# Patient Record
Sex: MALE | Race: WHITE | Employment: FULL TIME | URBAN - METROPOLITAN AREA
[De-identification: names, ages, dates, MRNs, and addresses within clinical notes are randomized per-mention and may not be internally consistent; named-entity substitution may affect disease eponyms.]

---

## 2017-01-13 ENCOUNTER — OFFICE VISIT (OUTPATIENT)
Dept: FAMILY MEDICINE CLINIC | Age: 47
End: 2017-01-13

## 2017-01-13 VITALS
WEIGHT: 199 LBS | DIASTOLIC BLOOD PRESSURE: 88 MMHG | BODY MASS INDEX: 30.16 KG/M2 | HEIGHT: 68 IN | OXYGEN SATURATION: 98 % | SYSTOLIC BLOOD PRESSURE: 128 MMHG | HEART RATE: 90 BPM

## 2017-01-13 DIAGNOSIS — Z00.00 WELL ADULT EXAM: Primary | ICD-10-CM

## 2017-01-13 PROCEDURE — 99396 PREV VISIT EST AGE 40-64: CPT | Performed by: FAMILY MEDICINE

## 2017-02-03 ENCOUNTER — TELEPHONE (OUTPATIENT)
Dept: ORTHOPEDIC SURGERY | Age: 47
End: 2017-02-03

## 2017-03-20 ENCOUNTER — OFFICE VISIT (OUTPATIENT)
Dept: FAMILY MEDICINE CLINIC | Age: 47
End: 2017-03-20

## 2017-03-20 VITALS
SYSTOLIC BLOOD PRESSURE: 120 MMHG | DIASTOLIC BLOOD PRESSURE: 84 MMHG | HEART RATE: 74 BPM | OXYGEN SATURATION: 96 % | BODY MASS INDEX: 31.7 KG/M2 | WEIGHT: 205.4 LBS

## 2017-03-20 DIAGNOSIS — M94.0 COSTOCHONDRITIS: Primary | ICD-10-CM

## 2017-03-20 PROCEDURE — 99213 OFFICE O/P EST LOW 20 MIN: CPT | Performed by: FAMILY MEDICINE

## 2017-03-20 RX ORDER — MELOXICAM 15 MG/1
15 TABLET ORAL DAILY
Qty: 30 TABLET | Refills: 3
Start: 2017-03-20 | End: 2018-12-06

## 2018-11-29 ENCOUNTER — TELEPHONE (OUTPATIENT)
Dept: FAMILY MEDICINE CLINIC | Age: 48
End: 2018-11-29

## 2018-11-29 DIAGNOSIS — Z00.00 PHYSICAL EXAM, ANNUAL: Primary | ICD-10-CM

## 2018-12-06 ENCOUNTER — OFFICE VISIT (OUTPATIENT)
Dept: FAMILY MEDICINE CLINIC | Age: 48
End: 2018-12-06
Payer: COMMERCIAL

## 2018-12-06 VITALS
DIASTOLIC BLOOD PRESSURE: 70 MMHG | WEIGHT: 201.2 LBS | SYSTOLIC BLOOD PRESSURE: 102 MMHG | BODY MASS INDEX: 30.49 KG/M2 | HEART RATE: 73 BPM | HEIGHT: 68 IN | OXYGEN SATURATION: 98 %

## 2018-12-06 DIAGNOSIS — H61.23 BILATERAL IMPACTED CERUMEN: ICD-10-CM

## 2018-12-06 DIAGNOSIS — Z00.00 PREVENTATIVE HEALTH CARE: Primary | ICD-10-CM

## 2018-12-06 PROCEDURE — 99396 PREV VISIT EST AGE 40-64: CPT | Performed by: PHYSICIAN ASSISTANT

## 2018-12-06 ASSESSMENT — ENCOUNTER SYMPTOMS
TROUBLE SWALLOWING: 0
DIARRHEA: 0
SHORTNESS OF BREATH: 0
VOICE CHANGE: 0
CONSTIPATION: 0
CHEST TIGHTNESS: 0
SORE THROAT: 0
BACK PAIN: 0
EYE PAIN: 0
COUGH: 0
ABDOMINAL PAIN: 0

## 2018-12-06 ASSESSMENT — PATIENT HEALTH QUESTIONNAIRE - PHQ9
SUM OF ALL RESPONSES TO PHQ QUESTIONS 1-9: 0
SUM OF ALL RESPONSES TO PHQ QUESTIONS 1-9: 0
1. LITTLE INTEREST OR PLEASURE IN DOING THINGS: 0
2. FEELING DOWN, DEPRESSED OR HOPELESS: 0
SUM OF ALL RESPONSES TO PHQ9 QUESTIONS 1 & 2: 0

## 2018-12-06 NOTE — PROGRESS NOTES
Subjective:      Patient ID: Cirilo Black is a 50 y.o. male. HPI Patient is here today for his annual check up. He has no complaints today. He had labs done last week. He does not take any medications on a daily basis. He has really worked on his diet the last 2 months, losing 15lbs. He is exercising a little but mostly dieting. He is sleeping \"ok\" but has trouble sleeping a few nights a week. He says he doesn't snore anymore since he lost weight. He says he has trouble falling asleep mainly when he falls asleep on the cough and wakes back up and goes to bed, can't fall back asleep. He has noticed very slightly a decreased urinary stream but \"not that bad\". He has no bowel issues. He has good energy level. He is current with tdap and flu vaccines. He is current with eye and dental exams as well. Review of Systems   Constitutional: Negative for appetite change, fatigue and unexpected weight change. HENT: Negative for congestion, dental problem, ear pain, hearing loss, sore throat, trouble swallowing and voice change. Eyes: Negative for pain and visual disturbance. Respiratory: Negative for cough, chest tightness and shortness of breath. Cardiovascular: Negative for chest pain and palpitations. Gastrointestinal: Negative for abdominal pain, constipation and diarrhea. Genitourinary: Negative for difficulty urinating. Musculoskeletal: Negative for arthralgias, back pain, myalgias and neck pain. Skin: Negative for rash. Neurological: Negative for dizziness, speech difficulty, weakness, numbness and headaches. Hematological: Negative for adenopathy. Psychiatric/Behavioral: Negative for confusion and sleep disturbance. The patient is not nervous/anxious. Objective:   Physical Exam   Constitutional: He is oriented to person, place, and time. Vital signs are normal. He appears well-developed and well-nourished. He is cooperative. HENT:   Head: Normocephalic.    Nose: Nose normal.   Mouth/Throat: Uvula is midline, oropharynx is clear and moist and mucous membranes are normal.   Bilateral cerumen impaction   Eyes: Pupils are equal, round, and reactive to light. Conjunctivae are normal.   Neck: Normal range of motion. Neck supple. No thyromegaly present. Cardiovascular: Normal rate, regular rhythm, S1 normal, S2 normal, normal heart sounds and normal pulses. PMI is not displaced. Pulmonary/Chest: Effort normal and breath sounds normal. He has no decreased breath sounds. He has no wheezes. He has no rhonchi. He has no rales. Abdominal: Soft. Normal appearance and bowel sounds are normal. There is no hepatosplenomegaly. There is no tenderness. There is no rebound, no guarding and no CVA tenderness. No hernia. Musculoskeletal: Normal range of motion. Lymphadenopathy:     He has no cervical adenopathy. Neurological: He is alert and oriented to person, place, and time. He has normal strength. No sensory deficit. Gait normal.   Skin: Skin is warm, dry and intact. No rash noted. No cyanosis. Psychiatric: He has a normal mood and affect. His speech is normal and behavior is normal.       Assessment:      Jb Bourne was seen today for annual exam and mass. Diagnoses and all orders for this visit:    Preventative health care    Bilateral impacted cerumen             Plan:      Reviewed labs, WNL other than slightly elevated LDL, all other HM current, return in a year.         [place], [place]

## 2019-12-03 ENCOUNTER — TELEPHONE (OUTPATIENT)
Dept: FAMILY MEDICINE CLINIC | Age: 49
End: 2019-12-03

## 2019-12-03 DIAGNOSIS — Z00.00 PHYSICAL EXAM, ANNUAL: Primary | ICD-10-CM

## 2020-01-03 ENCOUNTER — OFFICE VISIT (OUTPATIENT)
Dept: FAMILY MEDICINE CLINIC | Age: 50
End: 2020-01-03
Payer: COMMERCIAL

## 2020-01-03 VITALS
HEART RATE: 91 BPM | OXYGEN SATURATION: 95 % | BODY MASS INDEX: 32.8 KG/M2 | DIASTOLIC BLOOD PRESSURE: 84 MMHG | HEIGHT: 68 IN | SYSTOLIC BLOOD PRESSURE: 114 MMHG | WEIGHT: 216.4 LBS

## 2020-01-03 PROCEDURE — 99396 PREV VISIT EST AGE 40-64: CPT | Performed by: FAMILY MEDICINE

## 2020-01-03 RX ORDER — CLINDAMYCIN PHOSPHATE 10 MG/G
GEL TOPICAL
Qty: 30 G | Refills: 1 | Status: SHIPPED | OUTPATIENT
Start: 2020-01-03 | End: 2020-01-10

## 2020-01-03 RX ORDER — TERBINAFINE HYDROCHLORIDE 250 MG/1
250 TABLET ORAL DAILY
Qty: 90 TABLET | Refills: 0 | Status: SHIPPED | OUTPATIENT
Start: 2020-01-03 | End: 2020-04-02

## 2020-01-03 ASSESSMENT — PATIENT HEALTH QUESTIONNAIRE - PHQ9
SUM OF ALL RESPONSES TO PHQ QUESTIONS 1-9: 0
2. FEELING DOWN, DEPRESSED OR HOPELESS: 0
1. LITTLE INTEREST OR PLEASURE IN DOING THINGS: 0
SUM OF ALL RESPONSES TO PHQ9 QUESTIONS 1 & 2: 0
SUM OF ALL RESPONSES TO PHQ QUESTIONS 1-9: 0

## 2020-01-03 NOTE — PROGRESS NOTES
History and Physical      Stacy Castillo  YOB: 1970    Date of Service:  1/3/2020    Chief Complaint:   Stacy Castillo is a 52 y.o. male who  presents for physical examination. HPI: Patient presents for physical examination. He is concerned he keeps having recurrent skin infection over shoulder areas bilaterally. He also has a toenail that was injured and is discolored at this point of time. Otherwise he feels he is doing well.     Wt Readings from Last 3 Encounters:   01/03/20 216 lb 6.4 oz (98.2 kg)   12/06/18 201 lb 3.2 oz (91.3 kg)   03/20/17 205 lb 6.4 oz (93.2 kg)     BP Readings from Last 3 Encounters:   01/03/20 114/84   12/06/18 102/70   03/20/17 120/84       Patient Active Problem List   Diagnosis   (none) - all problems resolved or deleted       Preventive Care:  Health Maintenance   Topic Date Due    HIV screen  07/09/1985    DTaP/Tdap/Td vaccine (3 - Td) 11/22/2023    Lipid screen  12/31/2024    Flu vaccine  Completed    Pneumococcal 0-64 years Vaccine  Aged Out      Self-testicular exams: Yes  Sexual activity: single partner, contraception - none   Last eye exam: 2018, normal  Exercise: walks 2 time(s) per week    Lipid panel:    Lab Results   Component Value Date    CHOL 213 (H) 12/31/2019    CHOL 175 (H) 12/31/2019    TRIG 221 (H) 12/31/2019    HDL 38 (L) 12/31/2019    LDLCALC 131 (H) 12/31/2019       Living will : No    Immunization History   Administered Date(s) Administered    DT (pediatric) 03/27/2000    Influenza Vaccine, unspecified formulation 11/01/2015, 10/10/2016    Influenza Virus Vaccine 10/22/2013, 10/22/2014, 09/01/2019    Tdap (Boostrix, Adacel) 11/22/2013       Allergies   Allergen Reactions    Erythromycin      Stomach cramps     No outpatient medications have been marked as taking for the 1/3/20 encounter (Office Visit) with Karina Deleon MD.       Past Medical History:   Diagnosis Date    Panic disorder without agoraphobia     Pure Systems:  A comprehensive review of systems was negative except for what was noted in the HPI. Physical Exam:   Vitals:    01/03/20 0803   BP: 114/84   Site: Right Upper Arm   Position: Sitting   Cuff Size: Large Adult   Pulse: 91   SpO2: 95%   Weight: 216 lb 6.4 oz (98.2 kg)   Height: 5' 8\" (1.727 m)     Body mass index is 32.9 kg/m². Constitutional: He is oriented to person, place, and time. He appears well-developed and well-nourished. No distress. HEENT:   Head: Normocephalic and atraumatic. Right Ear: Tympanic membrane, external ear and ear canal normal.   Left Ear: Tympanic membrane, external ear and ear canal normal.   Nose: Nose normal.   Mouth/Throat: Oropharynx is clear and moist and mucous membranes are normal. No oropharyngeal exudate or posterior oropharyngeal erythema. He has no cervical adenopathy. Eyes: Conjunctivae and extraocular motions are normal. Pupils are equal, round, and reactive to light. Neck: Full passive range of motion without pain. Neck supple. No JVD present. Carotid bruit is not present. No mass and no thyromegaly present. Cardiovascular: Normal rate, regular rhythm, normal heart sounds and intact distal pulses. Exam reveals no gallop and no friction rub. No murmur heard. Pulmonary/Chest: Effort normal and breath sounds normal. No respiratory distress. He has no wheezes, rhonchi or rales. Abdominal: Soft, non-tender. Bowel sounds and aorta are normal. There is no organomegaly, mass or bruit. Genitourinary:  genitals normal without hernia or inguinal adenopathy. Musculoskeletal: Normal range of motion, no synovitis. He exhibits no edema. Neurological: He is alert and oriented to person, place, and time. He has normal reflexes. No cranial nerve deficit. Coordination normal.   Skin: Skin is warm, dry and intact. No suspicious lesions are noted. Right great toenail with typical tinea involvement.   Bilateral shoulders with folliculitis right greater than left  Psychiatric: He has a normal mood and affect. His speech is normal and behavior is normal. Judgment, cognition and memory are normal.     Assessment/Plan:    Nhi Ambrose was seen today for annual exam.    Diagnoses and all orders for this visit:    Well adult exam    Tinea unguium    Folliculitis    Other orders  -     terbinafine (LAMISIL) 250 MG tablet; Take 1 tablet by mouth daily  -     clindamycin (CLINDAGEL) 1 % gel; Apply topically 2 times daily. Pt appears stable & reviewed labs with patient. Patient received counseling on the following healthy behaviors: nutrition and exercise     Patient given educational materials     Health maintenance updated    Discussed use, benefit, and side effects of prescribed medications. Barriers to medication compliance addressed. All patient questions answered. Pt voiced understanding. Patient needs RTC in one year. Please note that this chart was generated using Dragon dictation software. Although every effort was made to ensure the accuracy of this automated transcription, some errors in transcription may have occurred. Patient was evaluated and examined. I performed the physical examination and key/critical portions of the history were approved and edited.  I also confirm that the note above accurately reflects all work, treatment, procedures, and medical decision making performed by me, Mitzy Diana M.D.

## 2020-01-03 NOTE — PATIENT INSTRUCTIONS
exposed skin. · See a dentist one or two times a year for checkups and to have your teeth cleaned. · Wear a seat belt in the car. Follow your doctor's advice about when to have certain tests. These tests can spot problems early. For everyone  · Cholesterol. Have the fat (cholesterol) in your blood tested after age 21. Your doctor will tell you how often to have this done based on your age, family history, or other things that can increase your risk for heart disease. · Blood pressure. Have your blood pressure checked during a routine doctor visit. Your doctor will tell you how often to check your blood pressure based on your age, your blood pressure results, and other factors. · Vision. Talk with your doctor about how often to have a glaucoma test.  · Diabetes. Ask your doctor whether you should have tests for diabetes. · Colon cancer. Your risk for colorectal cancer gets higher as you get older. Some experts say that adults should start regular screening at age 48 and stop at age 76. Others say to start before age 48 or continue after age 76. Talk with your doctor about your risk and when to start and stop screening. For women  · Breast exam and mammogram. Talk to your doctor about when you should have a clinical breast exam and a mammogram. Medical experts differ on whether and how often women under 50 should have these tests. Your doctor can help you decide what is right for you. · Cervical cancer screening test and pelvic exam. Begin with a Pap test at age 24. The test often is part of a pelvic exam. Starting at age 27, you may choose to have a Pap test, an HPV test, or both tests at the same time (called co-testing). Talk with your doctor about how often to have testing. · Tests for sexually transmitted infections (STIs). Ask whether you should have tests for STIs. You may be at risk if you have sex with more than one person, especially if your partners do not wear condoms.   For men  · Tests for sexually transmitted infections (STIs). Ask whether you should have tests for STIs. You may be at risk if you have sex with more than one person, especially if you do not wear a condom. · Testicular cancer exam. Ask your doctor whether you should check your testicles regularly. · Prostate exam. Talk to your doctor about whether you should have a blood test (called a PSA test) for prostate cancer. Experts differ on whether and when men should have this test. Some experts suggest it if you are older than 39 and are -American or have a father or brother who got prostate cancer when he was younger than 72. When should you call for help? Watch closely for changes in your health, and be sure to contact your doctor if you have any problems or symptoms that concern you. Where can you learn more? Go to https://[employer]healthIgenica. org and sign in to your [employer] account. Enter P072 in the [employer] box to learn more about \"Well Visit, Ages 25 to 48: Care Instructions. \"     If you do not have an account, please click on the \"Sign Up Now\" link. Current as of: December 13, 2018  Content Version: 12.1  © 6464-2842 Healthwise, Incorporated. Care instructions adapted under license by [facility] ([facility]). If you have questions about a medical condition or this instruction, always ask your healthcare professional. [address] any warranty or liability for your use of this information.

## 2020-02-13 ENCOUNTER — TELEPHONE (OUTPATIENT)
Dept: FAMILY MEDICINE CLINIC | Age: 50
End: 2020-02-13

## 2020-02-14 NOTE — TELEPHONE ENCOUNTER
Made follow up phone call to patient. Reviewed that phone number he provided was a fax number and I was unable to get through. Patient can use GoodRx and receive medications at [facility] for approximately $15.  Given instructions on how to use. Patient verbalized understanding. Will call back with any questions or concerns.

## 2021-05-13 ENCOUNTER — VIRTUAL VISIT (OUTPATIENT)
Dept: PULMONOLOGY | Age: 51
End: 2021-05-13
Payer: COMMERCIAL

## 2021-05-13 DIAGNOSIS — R06.83 SNORING: ICD-10-CM

## 2021-05-13 DIAGNOSIS — G47.10 HYPERSOMNIA: Primary | ICD-10-CM

## 2021-05-13 DIAGNOSIS — E66.9 NON MORBID OBESITY, UNSPECIFIED OBESITY TYPE: Chronic | ICD-10-CM

## 2021-05-13 PROCEDURE — 99204 OFFICE O/P NEW MOD 45 MIN: CPT | Performed by: INTERNAL MEDICINE

## 2021-05-13 ASSESSMENT — SLEEP AND FATIGUE QUESTIONNAIRES
ESS TOTAL SCORE: 11
HOW LIKELY ARE YOU TO NOD OFF OR FALL ASLEEP WHILE SITTING AND TALKING TO SOMEONE: 0
HOW LIKELY ARE YOU TO NOD OFF OR FALL ASLEEP WHILE LYING DOWN TO REST IN THE AFTERNOON WHEN CIRCUMSTANCES PERMIT: 3
HOW LIKELY ARE YOU TO NOD OFF OR FALL ASLEEP WHILE SITTING AND READING: 1
HOW LIKELY ARE YOU TO NOD OFF OR FALL ASLEEP WHILE SITTING QUIETLY AFTER LUNCH WITHOUT ALCOHOL: 0
HOW LIKELY ARE YOU TO NOD OFF OR FALL ASLEEP WHILE SITTING INACTIVE IN A PUBLIC PLACE: 2
HOW LIKELY ARE YOU TO NOD OFF OR FALL ASLEEP WHEN YOU ARE A PASSENGER IN A CAR FOR AN HOUR WITHOUT A BREAK: 3

## 2021-05-13 NOTE — LETTER
[facility]  [place] [id number] [place] [address]  Phone: [phone]  Fax: [phone]      May 13, 2021       Patient: Yuliet Blandon   MR Number: [id number]   YOB: 1970   Date of Visit: 5/13/2021     Thank you for allowing me to participate in the care of Yuliet Blandon. Here is my assessment and plan. Also attached is a copy of his consult note:    ASSESSMENT:  Visit Diagnoses and Associated Orders     Hypersomnia   (New Problem)  -  Primary    needs work-up    Home Sleep Study (HST) [49138 Custom]   - Future Order         Snoring   (New Problem)      needs work-up    Home Sleep Study (HST) [16985 Custom]   - Future Order         Non morbid obesity, unspecified obesity type   (Not Stable)                 Plan:  One or more undiagnosed new problem with uncertain prognosis till final diagnosis is made. Differential diagnosis includes but not limited to: SAL, PLMD's, narcolepsy, parasomnias. Reviewed SAL (highest likelihood Dx): pathophysiology, diagnosis, complications and treatment. Instructed him not to drive if drowsy. Continue medications per her PCP and other physicians. Limit caffeine use after 3pm. Standard of care is to do in-lab PSG but insurance is mandating an inferior HST. 1 wk follow up after study to review his results. The chronic medical conditions listed are directly related to the primary diagnosis listed above. The management of the primary diagnosis affects the secondary diagnosis and vice versa. Reviewed CBC and renal panel from 12/31/2019 which were essentially normal.  Reviewed external notes from the patient's primary care doctor dated 1/3/2020 shows the patient with no long-term chronic issues. This information was analyzed to assess complexity and medical decision making in regards to further testing and management. Pt would medically benefit from wt loss for SAL (diet, exercise, surgical).     Orders Placed This Encounter Procedures    Home Sleep Study (HST)         If you have questions or concerns, please do not hesitate to call me. I look forward to following Milton Closs along with you.     Sincerely,      Lisa Snell MD    CC providers:  Belkis Dudley MD  [address] [place] [address] [address] [address]

## 2021-05-13 NOTE — PROGRESS NOTES
doctor dated 1/3/2020 shows the patient with no long-term chronic issues. This information was analyzed to assess complexity and medical decision making in regards to further testing and management. Pt would medically benefit from wt loss for SAL (diet, exercise, surgical). Orders Placed This Encounter   Procedures    Home Sleep Study (HST)          Subjective:     Patient ID: Aretha Yates is a 48 y.o. male. Chief Complaint   Patient presents with    Snoring    Breathing Problem       HPI:      Aretha Yates is a 48 y.o. male self-referred for a sleep evaluation. He complains of: snoring, witnessed apneas, excessive daytime sleepiness , non-restorative sleep, waking choking/gasping, drowsiness while driving (on long drives) and tossing and turning at night. He denies: cataplexy and hypnagogic hallucinations. Has trouble falling but more so maintaining sleep. Previous evaluation and treatment has included- None     DOT/CDL - No  FAA/'s license -No    Previous Report(s) Reviewed: historical medical records, office notes, and referral letter(s). Pertinent data has been documented.     [place] - Total score: 11    Caffeine Intake - Pop/Soda: 4 can(s) per day    Social History     Socioeconomic History    Marital status:      Spouse name: Not on file    Number of children: Not on file    Years of education: Not on file    Highest education level: Not on file   Occupational History    Not on file   Social Needs    Financial resource strain: Not on file    Food insecurity     Worry: Not on file     Inability: Not on file    Transportation needs     Medical: Not on file     Non-medical: Not on file   Tobacco Use    Smoking status: Never Smoker    Smokeless tobacco: Never Used   Substance and Sexual Activity    Alcohol use: No    Drug use: No    Sexual activity: Not on file   Lifestyle    Physical activity     Days per week: Not on file     Minutes per session: Not on file  Stress: Not on file   Relationships    Social connections     Talks on phone: Not on file     Gets together: Not on file     Attends Zoroastrian service: Not on file     Active member of club or organization: Not on file     Attends meetings of clubs or organizations: Not on file     Relationship status: Not on file    Intimate partner violence     Fear of current or ex partner: Not on file     Emotionally abused: Not on file     Physically abused: Not on file     Forced sexual activity: Not on file   Other Topics Concern    Not on file   Social History Narrative    Not on file        No current outpatient medications on file. No current facility-administered medications for this visit. Allergies as of 05/13/2021 - Review Complete 05/13/2021   Allergen Reaction Noted    Erythromycin  05/31/2013       Patient Active Problem List   Diagnosis    Non morbid obesity, unspecified obesity type       History reviewed. No pertinent past medical history. Family History   Problem Relation Age of Onset    Hypertension Mother     Diabetes Mother     Alcohol Abuse Father     Diabetes Maternal Aunt     Hypertension Maternal Grandmother     Diabetes Maternal Grandmother        Objective:     Vitals:  Patient reported Height and Weight Calculated BMI   Patient-Reported Vitals 5/13/2021   Patient-Reported Weight 220#   Patient-Reported Height 5'9\"       32.5     Due to COVID-19 this was a virtual visit and physical exam was deferred.     Electronically signed by Bob Hackett MD on5/13/2021 at 1:12 PM

## 2021-06-04 ENCOUNTER — HOSPITAL ENCOUNTER (OUTPATIENT)
Dept: SLEEP CENTER | Age: 51
Discharge: HOME | End: 2021-06-04
Payer: COMMERCIAL

## 2021-06-04 DIAGNOSIS — G47.10 HYPERSOMNIA: ICD-10-CM

## 2021-06-04 DIAGNOSIS — R06.83 SNORING: ICD-10-CM

## 2021-06-04 PROCEDURE — 95806 SLEEP STUDY UNATT&RESP EFFT: CPT | Performed by: INTERNAL MEDICINE

## 2021-06-04 PROCEDURE — 95806 SLEEP STUDY UNATT&RESP EFFT: CPT

## 2021-06-09 ENCOUNTER — TELEPHONE (OUTPATIENT)
Dept: PULMONOLOGY | Age: 51
End: 2021-06-09

## 2021-06-09 NOTE — PROGRESS NOTES
Emily Hendrickson         : 1970    Diagnosis: [x] SAL (G47.33) [] CSA (G47.31) [] Apnea (G47.30)   Length of Need: [x] 12 Months [] 99 Months [] Other:    Machine (JAILENE!): [x] Respironics Dream Station   2   Auto [] ResMed AirSense     Auto [] Other:     [x]  CPAP () [] Bilevel ()   Mode: [x] Auto [] Spontaneous    Mode: [] Auto [] Spontaneous          P min 6 cmH2O  P max 16 cmH2O                 Comfort Settings:   - Ramp Pressure: 4 cmH2O                                        - Ramp time: 15 min                                     -  Flex/EPR - 3 full time                                    - For ResMed Bilevel (TiMax-4 sec   TiMin- 0.2 sec)     Humidifier: [x] Heated ()        [x] Water chamber replacement ()/ 1 per 6 months        Mask:   [x] Nasal () /1 per 3 months [] Full Face () /1 per 3 months   [x] Patient choice -Size and fit mask [] Patient Choice - Size and fit mask   [] Dispense:  [] Dispense:    [x] Headgear () / 1 per 3 months [] Headgear () / 1 per 3 months   [x] Replacement Nasal Cushion ()/2 per month [] Interface Replacement ()/1 per month   [] Replacement Nasal Pillows ()/2 per month         Tubing: [x] Heated ()/1 per 3 months    [] Standard ()/1 per 3 months [] Other:           Filters: [x] Non-disposable ()/1 per 6 months     [x] Ultra-Fine, Disposable ()/2 per month        Miscellaneous: [] Chin Strap ()/ 1 per 6 months [] O2 bleed-in:       LPM   [] Oximetry on CPAP/Bilevel []  Other:    [x] Modem: ()         Start Order Date: 21    MEDICAL JUSTIFICATION:  I, the undersigned, certify that the above prescribed supplies are medically necessary for this patients wellbeing. In my opinion, the supplies are both reasonable and necessary in reference to accepted standards of medicalpractice in treatment of this patients condition.     Pablo Valdez MD      NPI: [id number]       Order Signed

## 2021-06-29 ENCOUNTER — OFFICE VISIT (OUTPATIENT)
Dept: FAMILY MEDICINE CLINIC | Age: 51
End: 2021-06-29
Payer: COMMERCIAL

## 2021-06-29 VITALS
BODY MASS INDEX: 36.29 KG/M2 | OXYGEN SATURATION: 98 % | SYSTOLIC BLOOD PRESSURE: 126 MMHG | HEART RATE: 72 BPM | TEMPERATURE: 97.9 F | RESPIRATION RATE: 16 BRPM | DIASTOLIC BLOOD PRESSURE: 84 MMHG | WEIGHT: 231.25 LBS | HEIGHT: 67 IN

## 2021-06-29 DIAGNOSIS — G47.33 OBSTRUCTIVE SLEEP APNEA SYNDROME: ICD-10-CM

## 2021-06-29 DIAGNOSIS — E66.9 NON MORBID OBESITY, UNSPECIFIED OBESITY TYPE: Chronic | ICD-10-CM

## 2021-06-29 DIAGNOSIS — Z12.11 COLON CANCER SCREENING: ICD-10-CM

## 2021-06-29 DIAGNOSIS — N52.9 ERECTILE DYSFUNCTION, UNSPECIFIED ERECTILE DYSFUNCTION TYPE: ICD-10-CM

## 2021-06-29 DIAGNOSIS — Z00.00 WELL ADULT EXAM: Primary | ICD-10-CM

## 2021-06-29 DIAGNOSIS — B35.1 TINEA UNGUIUM: ICD-10-CM

## 2021-06-29 PROCEDURE — 99396 PREV VISIT EST AGE 40-64: CPT | Performed by: FAMILY MEDICINE

## 2021-06-29 ASSESSMENT — PATIENT HEALTH QUESTIONNAIRE - PHQ9
2. FEELING DOWN, DEPRESSED OR HOPELESS: 0
SUM OF ALL RESPONSES TO PHQ QUESTIONS 1-9: 0
SUM OF ALL RESPONSES TO PHQ QUESTIONS 1-9: 0
1. LITTLE INTEREST OR PLEASURE IN DOING THINGS: 0
SUM OF ALL RESPONSES TO PHQ9 QUESTIONS 1 & 2: 0
SUM OF ALL RESPONSES TO PHQ QUESTIONS 1-9: 0

## 2021-06-29 NOTE — PROGRESS NOTES
History and Physical      Beauty Halt  YOB: 1970    Date of Service:  6/29/2021    Chief Complaint:   Marcial Ruelas is a 48 y.o. male who  presents for physical examination. HPI: Patient presents for physical examination. He had a recent evaluation with sleep specialist and is now on a CPAP for sleep apnea just for the last 2 weeks. He has not noticed any significant change from apparently his wife is. His biggest concern is over the last several years he has gained weight and is having difficulty losing his weight. He also has noted decreased libido and eating issues. There is diabetes mellitus in the family. He is also concerned that he continues to have fungal involvement of the left great toenail. He has failed oral medication treatment.     Wt Readings from Last 3 Encounters:   06/29/21 231 lb 4 oz (104.9 kg)   01/03/20 216 lb 6.4 oz (98.2 kg)   12/06/18 201 lb 3.2 oz (91.3 kg)     BP Readings from Last 3 Encounters:   06/29/21 126/84   01/03/20 114/84   12/06/18 102/70       Patient Active Problem List   Diagnosis    Non morbid obesity, unspecified obesity type       Allergies   Allergen Reactions    Erythromycin      Stomach cramps     No outpatient medications have been marked as taking for the 6/29/21 encounter (Office Visit) with Fran Ram MD.       Past Medical History:   Diagnosis Date    Sleep apnea      Past Surgical History:   Procedure Laterality Date    DENTAL SURGERY      FOOT SURGERY      pencil lead removal    SHOULDER ARTHROSCOPY Left 01/29/2016    Subacromial Decompression Labral Debridement Distal Clavicle Excision    SHOULDER ARTHROSCOPY Right 06/24/2016     RIGHT SHOULDER ARTHROSCOPE, SUBACROMIAL DECOMPRESSION, DISTAL     Family History   Problem Relation Age of Onset    Hypertension Mother     Diabetes Mother     Alcohol Abuse Father     Diabetes Maternal Aunt     Hypertension Maternal Grandmother     Diabetes Maternal Grandmother Social History     Socioeconomic History    Marital status:      Spouse name: Not on file    Number of children: Not on file    Years of education: Not on file    Highest education level: Not on file   Occupational History    Not on file   Tobacco Use    Smoking status: Never Smoker    Smokeless tobacco: Never Used   Substance and Sexual Activity    Alcohol use: No    Drug use: No    Sexual activity: Not on file   Other Topics Concern    Not on file   Social History Narrative    Not on file     Social Determinants of Health     Financial Resource Strain:     Difficulty of Paying Living Expenses:    Food Insecurity:     Worried About Running Out of Food in the Last Year:     [address] in the Last Year:    Transportation Needs:     Lack of Transportation (Medical):  Lack of Transportation (Non-Medical):    Physical Activity:     Days of Exercise per Week:     Minutes of Exercise per Session:    Stress:     Feeling of Stress :    Social Connections:     Frequency of Communication with Friends and Family:     Frequency of Social Gatherings with Friends and Family:     Attends Holiness Services:     Active Member of Clubs or Organizations:     Attends Club or Organization Meetings:     Marital Status:    Intimate Partner Violence:     Fear of Current or Ex-Partner:     Emotionally Abused:     Physically Abused:     Sexually Abused:        Self-testicular exams: Yes  Sexual activity: single partner, contraception - none   Last eye exam: 2019, normal  Exercise: no regular exercise    Review of Systems:  A comprehensive review of systems was negative except for what was noted in the HPI.      Physical Exam:   Vitals:    06/29/21 1518   BP: 126/84   Site: Right Upper Arm   Position: Sitting   Cuff Size: Large Adult   Pulse: 72   Resp: 16   Temp: 97.9 °F (36.6 °C)   TempSrc: Infrared   SpO2: 98%   Weight: 231 lb 4 oz (104.9 kg)   Height: 5' 7.25\" (1.708 m)     Body mass index is 35.95 kg/m². Constitutional: He is oriented to person, place, and time. He appears well-developed and well-nourished. No distress. HEENT:   Head: Normocephalic and atraumatic. Right Ear: Tympanic membrane, external ear and ear canal normal.   Left Ear: Tympanic membrane, external ear and ear canal normal.   Mouth/Throat: Oropharynx is clear and moist and mucous membranes are normal. No oropharyngeal exudate or posterior oropharyngeal erythema. He has no cervical adenopathy. Eyes: Conjunctivae and extraocular motions are normal. Pupils are equal, round, and reactive to light. Neck:  Supple. No JVD present. Carotid bruit is not present. No mass and no thyromegaly present. Cardiovascular: Normal rate, regular rhythm, normal heart sounds and intact distal pulses. Exam reveals no gallop and no friction rub. No murmur heard. Pulmonary/Chest: Effort normal and breath sounds normal. No respiratory distress. He has no wheezes, rhonchi or rales. Abdominal: Soft, non-tender. Bowel sounds and aorta are normal. There is no organomegaly, mass or bruit. Genitourinary:  genitals normal without hernia or inguinal adenopathy. Musculoskeletal: Normal range of motion, no synovitis. He exhibits no edema. Neurological: He is alert and oriented to person, place, and time. He has normal reflexes. No cranial nerve deficit. Coordination normal.   Skin: Skin is warm, dry and intact. No suspicious lesions are noted. Psychiatric: He has a normal mood and affect.  His speech is normal and behavior is normal. Judgment, cognition and memory are normal.     Preventive Care:  Health Maintenance   Topic Date Due    Hepatitis C screen  Never done    HIV screen  Never done    Shingles Vaccine (1 of 2) Never done    Colon cancer screen colonoscopy  Never done    Flu vaccine (Season Ended) 09/01/2021    DTaP/Tdap/Td vaccine (3 - Td or Tdap) 11/22/2023    Lipid screen  12/31/2024    COVID-19 Vaccine  Completed    Hepatitis A vaccine  Aged Out    Hepatitis B vaccine  Aged Out    Hib vaccine  Aged Out    Meningococcal (ACWY) vaccine  Aged Out    Pneumococcal 0-64 years Vaccine  Aged [employer] plan of care for Ray Strong        6/29/2021           Preventive Measures Status       Recommendations for screening   Prostate Cancer Screen  No results found for: PSA   This test is not clinically indicated    Colon Cancer Screen  Last colonoscopy: no Test recommended and ordered   Diabetes Screen  Glucose (mg/dL)   Date Value   12/31/2019 85    Test recommended and ordered   Cholesterol Screen  Lab Results   Component Value Date    CHOL 213 (H) 12/31/2019    CHOL 175 (H) 12/31/2019    TRIG 221 (H) 12/31/2019    HDL 38 (L) 12/31/2019    LDLCALC 131 (H) 12/31/2019    Test recommended and ordered   Aspirin for Cardiovascular Prevention   No Not indicated   Weight: Body mass index is 35.95 kg/m². 5' 7.25\" (1.708 m)231 lb 4 oz (104.9 kg)  Your BMI is 25 or greater, which indicates that you are overweight   Living Will: No Additional information provided    Recommended Immunizations    Immunization History   Administered Date(s) Administered    COVID-19, Anand Peter, PF, 30mcg/0.3mL 03/19/2021, 04/08/2021    DT (pediatric) 03/27/2000    Influenza Vaccine, unspecified formulation 11/01/2015, 10/10/2016    Influenza Virus Vaccine 10/22/2013, 10/22/2014, 09/01/2019    Tdap (Boostrix, Adacel) 11/22/2013    Influenza vaccine:  recommended every fall         Other Recommendations ·   Try to get at least 30 minutes of exercise 3-4 days per week             Assessment/Plan:  Skyla Harrison was seen today for annual exam.    Diagnoses and all orders for this visit:    Well adult exam  -     Lipid Panel; Future  -     Comprehensive Metabolic Panel; Future  -     TSH without Reflex; Future  -     Testosterone;  Future    Tinea unguium    Colon cancer screening  -     AFL - Luis Mendoza MD, Gastroenterology, [facility] sleep apnea syndrome    Erectile dysfunction, unspecified erectile dysfunction type    Non morbid obesity, unspecified obesity type    Other orders  -     ciclopirox (PENLAC) 8 % solution; Apply topically nightly. -     betamethasone valerate (VALISONE) 0.1 % cream; Apply topically 2 times daily. Pt appears stable & labs ordered. Adjustments of medication will be done after laboratory testing results available. Continue using CPAP for sleep apnea. Try topical medication for the fungal involvement    Patient received counseling on the following healthy behaviors: nutrition and exercise and weight loss. Patient needs a structured exercise program.    Patient given educational materials     Health maintenance updated    Discussed use, benefit, and side effects of prescribed medications. Barriers to medication compliance addressed. All patient questions answered. Pt voiced understanding. Patient needs RTC in one year for CPE. Medical decision making of moderate complexity. Please note that this chart was generated using Dragon dictation software. Although every effort was made to ensure the accuracy of this automated transcription, some errors in transcription may have occurred. Patient was evaluated and examined. I performed the physical examination and key/critical portions of the history were approved and edited.  I also confirm that the note above accurately reflects all work, treatment, procedures, and medical decision making performed by me, Neeraj Kimbrough M.D.

## 2021-06-29 NOTE — PATIENT INSTRUCTIONS
Patient Education        Well Visit, Men 48 to 72: Care Instructions  Overview     Well visits can help you stay healthy. Your doctor has checked your overall health and may have suggested ways to take good care of yourself. Your doctor also may have recommended tests. At home, you can help prevent illness with healthy eating, regular exercise, and other steps. Follow-up care is a key part of your treatment and safety. Be sure to make and go to all appointments, and call your doctor if you are having problems. It's also a good idea to know your test results and keep a list of the medicines you take. How can you care for yourself at home? · Get screening tests that you and your doctor decide on. Screening helps find diseases before any symptoms appear. · Eat healthy foods. Choose fruits, vegetables, whole grains, protein, and low-fat dairy foods. Limit fat, especially saturated fat. Reduce salt in your diet. · Limit alcohol. Have no more than 2 drinks a day or 14 drinks a week. · Get at least 30 minutes of exercise on most days of the week. Walking is a good choice. You also may want to do other activities, such as running, swimming, cycling, or playing tennis or team sports. · Reach and stay at a healthy weight. This will lower your risk for many problems, such as obesity, diabetes, heart disease, and high blood pressure. · Do not smoke. Smoking can make health problems worse. If you need help quitting, talk to your doctor about stop-smoking programs and medicines. These can increase your chances of quitting for good. · Care for your mental health. It is easy to get weighed down by worry and stress. Learn strategies to manage stress, like deep breathing and mindfulness, and stay connected with your family and community. If you find you often feel sad or hopeless, talk with your doctor. Treatment can help.   · Talk to your doctor about whether you have any risk factors for sexually transmitted infections (STIs). You can help prevent STIs if you wait to have sex with a new partner (or partners) until you've each been tested for STIs. It also helps if you use condoms (male or female condoms) and if you limit your sex partners to one person who only has sex with you. Vaccines are available for some STIs. · If it's important to you to prevent pregnancy with your partner, talk with your doctor about birth control options that might be best for you. · If you think you may have a problem with alcohol or drug use, talk to your doctor. This includes prescription medicines (such as amphetamines and opioids) and illegal drugs (such as cocaine and methamphetamine). Your doctor can help you figure out what type of treatment is best for you. · Protect your skin from too much sun. When you're outdoors from 10 a.m. to 4 p.m., stay in the shade or cover up with clothing and a hat with a wide brim. Wear sunglasses that block UV rays. Even when it's cloudy, put broad-spectrum sunscreen (SPF 30 or higher) on any exposed skin. · See a dentist one or two times a year for checkups and to have your teeth cleaned. · Wear a seat belt in the car. When should you call for help? Watch closely for changes in your health, and be sure to contact your doctor if you have any problems or symptoms that concern you. Where can you learn more? Go to https://[employer].[facility]. org and sign in to your [employer] account. Enter P739 in the [facility] box to learn more about \"Well Visit, Men 48 to 72: Care Instructions. \"     If you do not have an account, please click on the \"Sign Up Now\" link. Current as of: May 27, 2020               Content Version: 12.9  © 2006-2021 Healthwise, Incorporated. Care instructions adapted under license by [facility] ([facility]).  If you have questions about a medical condition or this instruction, always ask your healthcare professional. [address]  any warranty or liability for your use of this information. Patient Education        Toenail Fungus: Care Instructions  Overview  A nail that is infected by a fungus usually turns white or yellow. As the fungus spreads, the nail turns a darker color and gets thicker. And the nail edges start to turn ragged and crumble. A bad infection can cause pain, and the nail may pull away from the toe or finger. Nails that are exposed to moisture and warmth a lot are more likely to get infected by a fungus. This can happen from wearing sweaty shoes often and from walking barefoot on shower floors. Or it can happen if you share personal things, such as towels and nail clippers. It's hard to treat nail fungus. And the infection can return after it has cleared up. But medicines can sometimes get rid of nail fungus for good. If the infection is very bad, or if it causes a lot of pain, you may need to have the nail removed. Follow-up care is a key part of your treatment and safety. Be sure to make and go to all appointments, and call your doctor if you are having problems. It's also a good idea to know your test results and keep a list of the medicines you take. How can you care for yourself at home? · Take your medicines exactly as prescribed. Call your doctor if you have any problems with your medicine. · If your doctor gave you a cream or liquid to put on your nail, use it exactly as directed. · Wash your hands and feet often, and wash your hands after touching your feet. · Keep your nails clean and dry. Dry your feet completely after you bathe and before you put on shoes and socks. · Keep your nails trimmed. · Change socks often. Wear dry socks that absorb moisture. · Don't go barefoot in public places. · Use a spray or powder that fights fungus on your feet and in your shoes. · Don't pick at the skin around your nails. · Don't use nail polish or fake nails on your nails.   · Don't share personal things, such as towels and nail

## 2021-08-18 ENCOUNTER — VIRTUAL VISIT (OUTPATIENT)
Dept: PULMONOLOGY | Age: 51
End: 2021-08-18
Payer: COMMERCIAL

## 2021-08-18 DIAGNOSIS — G47.33 OBSTRUCTIVE SLEEP APNEA SYNDROME: Primary | ICD-10-CM

## 2021-08-18 DIAGNOSIS — E66.9 NON MORBID OBESITY, UNSPECIFIED OBESITY TYPE: Chronic | ICD-10-CM

## 2021-08-18 PROCEDURE — G8427 DOCREV CUR MEDS BY ELIG CLIN: HCPCS | Performed by: NURSE PRACTITIONER

## 2021-08-18 PROCEDURE — 3017F COLORECTAL CA SCREEN DOC REV: CPT | Performed by: NURSE PRACTITIONER

## 2021-08-18 PROCEDURE — 99213 OFFICE O/P EST LOW 20 MIN: CPT | Performed by: NURSE PRACTITIONER

## 2021-08-18 ASSESSMENT — SLEEP AND FATIGUE QUESTIONNAIRES
HOW LIKELY ARE YOU TO NOD OFF OR FALL ASLEEP WHILE SITTING AND TALKING TO SOMEONE: 0
HOW LIKELY ARE YOU TO NOD OFF OR FALL ASLEEP WHILE LYING DOWN TO REST IN THE AFTERNOON WHEN CIRCUMSTANCES PERMIT: 2
HOW LIKELY ARE YOU TO NOD OFF OR FALL ASLEEP WHEN YOU ARE A PASSENGER IN A CAR FOR AN HOUR WITHOUT A BREAK: 2
HOW LIKELY ARE YOU TO NOD OFF OR FALL ASLEEP IN A CAR, WHILE STOPPED FOR A FEW MINUTES IN TRAFFIC: 0
HOW LIKELY ARE YOU TO NOD OFF OR FALL ASLEEP WHILE WATCHING TV: 2
ESS TOTAL SCORE: 6
HOW LIKELY ARE YOU TO NOD OFF OR FALL ASLEEP WHILE SITTING QUIETLY AFTER LUNCH WITHOUT ALCOHOL: 0
HOW LIKELY ARE YOU TO NOD OFF OR FALL ASLEEP WHILE SITTING INACTIVE IN A PUBLIC PLACE: 0
HOW LIKELY ARE YOU TO NOD OFF OR FALL ASLEEP WHILE SITTING AND READING: 0

## 2021-08-18 NOTE — PROGRESS NOTES
Ray Novak MD, FAA, [facility]  Clint Malone, MSN, RN, [address] [address]  Dept: [phone]  Dept Fax: [phone]  Loc: [phone]    Subjective:     Patient ID: David Humphries is a 46 y.o. male. Chief Complaint   Patient presents with    Sleep Apnea       HPI:      Sleep Medicine Video Visit    Pursuant to the emergency declaration under the [facility], [facility] waiver authority and the [employer] Act this Telephone Visit was insisted, with patient's consent, to reduce the patient's risk of exposure to COVID-19 and provide continuity of care for an established patient. Services were provided through a synchronous discussion over a telephone and/or Video chat to substitute for in-person clinic visit, and coded as such. While patient is at home. Machine Modem/Download Info:  Compliance (hours/night): 7 hrs/night  Download AHI (/hour): 2.3 /HR  Average CPAP Pressure : 13.4 cmH2O           APAP - Settings  Pressure Min: 6 cmH2O  Pressure Max: 13 cmH2O                 Comfort Settings  Humidity Level (0-8): 0  Flex/EPR (0-3): 3 PAP Mask  Mask Type: Full Face mask  Clinically Relevant Leak: No     [place] - Total score: 6    Follow-up :     Last Visit : May 2021    Had study Insurance mandated HST done on 6/4/21 which showed an RHI - 53.1/hr with Low SaO2 - 80% and time below 90% of 11.3min.   This is consistent with severe SAL (327.23)      Subjective Health Changes: none      Over Night Oximetry: [] Yes  [] No  [x] NA [] WNL   Using O2: [] Yes  [] No  [x] NA   Patient is compliant with the machine  [x] Yes  [] No [] Per patient   Feeling rested when using the machine   [x] Yes  [] No     Pressure is comfortable with inspiration and expiration  [x] Yes  [] No   ([x] NA   [] Feeling of suffocation  [] Feeling like not

## 2021-08-18 NOTE — PROGRESS NOTES
Antonella Monroy         : 1970    Diagnosis: [x] SAL (G47.33) [] CSA (G47.31) [x] Apnea (G47.30)   Length of Need: [x] 6 Months [] 99 Months [x] Other: Hypoxia Unspecified (R09.02)       Miscellaneous: [] Chin Strap ()/ 1 per 6 months [] O2 bleed-in:       LPM   [x] Oximetry on CPAP/Bilevel []  Other:          Start Order Date: 21    MEDICAL JUSTIFICATION:  I, the undersigned, certify that the above prescribed supplies are medically necessary for this patients wellbeing. In my opinion, the supplies are both reasonable and necessary in reference to accepted standards of medicalpractice in treatment of this patients condition. MI Machado CNP      NPI: [id number]       Order Signed Date: 21    Electronically signed by MI Machado CNP on 2021 at 2:42 PM    Antonella Monroy  1970  [address]  [phone] (home)   [phone] (mobile)      Insurance Info (confirm with patient if correct):  Payor/Plan Subscr  Sex Relation Sub.  Ins. ID Effective Group Num

## 2021-11-15 ENCOUNTER — VIRTUAL VISIT (OUTPATIENT)
Dept: PULMONOLOGY | Age: 51
End: 2021-11-15
Payer: COMMERCIAL

## 2021-11-15 DIAGNOSIS — E66.9 NON MORBID OBESITY, UNSPECIFIED OBESITY TYPE: Chronic | ICD-10-CM

## 2021-11-15 DIAGNOSIS — G47.33 OBSTRUCTIVE SLEEP APNEA SYNDROME: Primary | ICD-10-CM

## 2021-11-15 PROCEDURE — G8427 DOCREV CUR MEDS BY ELIG CLIN: HCPCS | Performed by: NURSE PRACTITIONER

## 2021-11-15 PROCEDURE — 99213 OFFICE O/P EST LOW 20 MIN: CPT | Performed by: NURSE PRACTITIONER

## 2021-11-15 PROCEDURE — 3017F COLORECTAL CA SCREEN DOC REV: CPT | Performed by: NURSE PRACTITIONER

## 2021-11-15 ASSESSMENT — SLEEP AND FATIGUE QUESTIONNAIRES
HOW LIKELY ARE YOU TO NOD OFF OR FALL ASLEEP WHILE SITTING INACTIVE IN A PUBLIC PLACE: 0
ESS TOTAL SCORE: 0
HOW LIKELY ARE YOU TO NOD OFF OR FALL ASLEEP WHILE SITTING AND READING: 0
HOW LIKELY ARE YOU TO NOD OFF OR FALL ASLEEP WHILE SITTING QUIETLY AFTER LUNCH WITHOUT ALCOHOL: 0
HOW LIKELY ARE YOU TO NOD OFF OR FALL ASLEEP WHILE SITTING AND TALKING TO SOMEONE: 0
HOW LIKELY ARE YOU TO NOD OFF OR FALL ASLEEP WHEN YOU ARE A PASSENGER IN A CAR FOR AN HOUR WITHOUT A BREAK: 0
HOW LIKELY ARE YOU TO NOD OFF OR FALL ASLEEP IN A CAR, WHILE STOPPED FOR A FEW MINUTES IN TRAFFIC: 0
HOW LIKELY ARE YOU TO NOD OFF OR FALL ASLEEP WHILE WATCHING TV: 0
HOW LIKELY ARE YOU TO NOD OFF OR FALL ASLEEP WHILE LYING DOWN TO REST IN THE AFTERNOON WHEN CIRCUMSTANCES PERMIT: 0

## 2021-11-15 NOTE — PROGRESS NOTES
Mai Rob         : 1970    Diagnosis: [x] SAL (G47.33) [] CSA (G47.31) [] Apnea (G47.30)   Length of Need: [x] 12 Months [] 99 Months [] Other:    Machine (JAILENE!): [] Respironics Dream Station   2   Auto [] ResMed AirSense     Auto [] Other:     []  CPAP () [] Bilevel ()   Mode: [] Auto [] Spontaneous    Mode: [] Auto [] Spontaneous            Comfort Settings:   - Ramp Pressure:  cmH2O                                        - Ramp time: 15 min                                     -  Flex/EPR - 3 full time                                    - For ResMed Bilevel (TiMax-4 sec   TiMin- 0.2 sec)     Humidifier: [x] Heated ()        [x] Water chamber replacement ()/ 1 per 6 months        Mask:   [] Nasal () /1 per 3 months [x] Full Face () /1 per 3 months   [] Patient choice -Size and fit mask [x] Patient Choice - Size and fit mask   [] Dispense:  [] Dispense:    [] Headgear () / 1 per 3 months [x] Headgear () / 1 per 3 months   [] Replacement Nasal Cushion ()/2 per month [x] Interface Replacement ()/1 per month   [] Replacement Nasal Pillows ()/2 per month         Tubing: [x] Heated ()/1 per 3 months    [] Standard ()/1 per 3 months [] Other:           Filters: [x] Non-disposable ()/1 per 6 months     [x] Ultra-Fine, Disposable ()/2 per month        Miscellaneous: [] Chin Strap ()/ 1 per 6 months [] O2 bleed-in:       LPM   [] Oximetry on CPAP/Bilevel []  Other:    [x] Modem: ()         Start Order Date: 11/15/21    MEDICAL JUSTIFICATION:  I, the undersigned, certify that the above prescribed supplies are medically necessary for this patients wellbeing. In my opinion, the supplies are both reasonable and necessary in reference to accepted standards of medicalpractice in treatment of this patients condition.     MI Christie - CNP      NPI: [id number]       Order Signed Date: 11/15/21    Electronically signed by Jaswinder Carpio, APRN - CNP on 11/15/2021 at 2:13 PM    Carlo Greene  1970  [address]  [phone] (home)   [phone] (mobile)      Insurance Info (confirm with patient if correct):  Payor/Plan Subscr  Sex Relation Sub.  Ins. ID Effective Group Num

## 2021-11-15 NOTE — PROGRESS NOTES
Lilly Aguilar MD, FAASM, [facility]  Mable Iverson, MSN, RN, [address] [facility] [phone] [phone]  Dept: [phone]  Dept Fax: [phone]  Loc: [phone]    Subjective:     Patient ID: Dana Brown is a 46 y.o. male. Chief Complaint   Patient presents with    Sleep Apnea       HPI:      Sleep Medicine Video Visit    Pursuant to the emergency declaration under the [address] [place] [address] [facility] authority and the [employer] Act this Telephone Visit was insisted, with patient's consent, to reduce the patient's risk of exposure to COVID-19 and provide continuity of care for an established patient. Services were provided through a synchronous discussion over a telephone and/or Video chat to substitute for in-person clinic visit, and coded as such. While patient is at home.     Machine Modem/Download Info:  Compliance (hours/night): 7 hrs/night  Download AHI (/hour): 2.3 /HR  Average CPAP Pressure : 15.2 cmH2O           APAP - Settings  Pressure Min: 6 cmH2O  Pressure Max: 16 cmH2O                 Comfort Settings  Humidity Level (0-8): 0  Flex/EPR (0-3): 3 PAP Mask  Mask Type: Full Face mask  Clinically Relevant Leak: No     [place] - Total score: 0    Follow-up :     Last Visit : August 2021      Subjective Health Changes: None      Over Night Oximetry: [x] Yes  [] No  [] NA [x] WNL   Using O2: [] Yes  [x] No  [] NA   Patient is compliant with the machine  [x] Yes  [] No [] Per patient   Feeling rested when using the machine   [x] Yes  [] No     Pressure is comfortable with inspiration and expiration  [x] Yes  [] No   ([x] NA   [] Feeling of suffocation  [] Feeling like not enough air    [] To much pressure)     Noticed changes in pressure  [x] NA  [] Yes    [] No     Mask is fitting well  [x] Yes  [] No   Noting Mask Air Leak  [] Yes  [x] No Having painful Aerophagia  [] Yes  [x] No   Nocturia   0-1  per night. Having  HA upon waking  [] Yes  [x] No   Dry mouth upon waking   Dry Nose  Dry Eyes  [] Yes  [x] No   Congestion upon waking   [] Yes  [x] No    Nose Bleeds  [] Yes  [x] No   Using Sleep Aides  [x] NA  [] OTC  [] Per our office   [] Per another provider   Understands how to change humidification and/or tubing temperature for comfort while at home  [x] Yes  [] No     Difficulties falling asleep  [] Yes  [x] No   Difficulties staying asleep  [] Yes  [x] No   Approximate time to bed  11pm-12am   Approximate wake time  6am   Taking Naps  no   If taking naps usual length  [x] NA   If taking naps using the machine [x] NA  [] Yes    [] No    [] With and With out    Drowsy when driving  [] Yes  [x] No     Does patient carry a DOT/CDL  [] Yes  [x] No     Does patient carry FAA/[employer] License   [] Yes  [x] No      Any concerns noted with the machine at this time  [] Yes  [x] No       Assessment/Plan:   1. Obstructive sleep apnea syndrome  Assessment & Plan:  After downloading data and reviewing  Reviewed compliance download with pt. Supplies and parts as needed for his machine. These are medically necessary. Continue medications per his PCP and other physicians. Limit caffeine use after 3pm.    The chronic medical conditions listed are directly related to the primary diagnosis listed above. The management of the primary diagnosis affects the secondary diagnosis and vice versa    Patient is complaint encouraged to maintain compliance to aide on controlling other stated healthcare concerns. 2. Non morbid obesity, unspecified obesity type  Assessment & Plan:  Patient encouraged to work on maintaining a healthy weight per height. Achievable with diet restriction/modifications and exercise (may consult primary care if unsure of any restrictions or concerns).      Weight management directly correlates to risk of control and maintenance of Obstructive Sleep Apnea. - After pulling data and reviewing it   - Reviewed compliance download with patient    -Medically necessary supplies and parts as needed for his machine.   - Continue medications per his primary care provider and other physicians.   - Encouraged to limit caffeine use after 3pm.    - Encouraged him to work on weight loss through diet and exercise  - Educated not to drive when feeling sleepy   - Patient using [employer]  - Tube Temperature  Humidifier  (if you are dry turn it high)  Contacting the DME for ordering issues/Mask refit  Office locations  Compliance  Follow up  The risk of undercontrolling Obstructive sleep apnea  After speaking to the patient, patient is currently stable. We will continue with the current machine settings    The chronic medical conditions listed are directly related to the primary diagnosis listed above. The management of the primary diagnosis affects the secondary diagnosis and vice versa.     - Will follow up in off in 12 months    Electronically signed by  Ravi Mitchell, GISELLE, RN, CNP on 11/15/2021 at 2:08 PM

## 2022-01-06 ENCOUNTER — TELEPHONE (OUTPATIENT)
Dept: FAMILY MEDICINE CLINIC | Age: 52
End: 2022-01-06

## 2022-01-06 NOTE — TELEPHONE ENCOUNTER
Informed patient that we don't give antibiotics out for Covid, he was inclining about getting the micro antibodies. Informed he doesn't meet criteria. He said that he will call around to see if he can get somewhere else.

## 2022-01-06 NOTE — TELEPHONE ENCOUNTER
Patient called to state that he took a home COVID Test and would like to get an anti-biotic      Please advise and give him a callback

## 2022-11-07 ENCOUNTER — TELEMEDICINE (OUTPATIENT)
Dept: PULMONOLOGY | Age: 52
End: 2022-11-07
Payer: COMMERCIAL

## 2022-11-07 DIAGNOSIS — G47.33 OBSTRUCTIVE SLEEP APNEA SYNDROME: Primary | ICD-10-CM

## 2022-11-07 DIAGNOSIS — E66.9 NON MORBID OBESITY, UNSPECIFIED OBESITY TYPE: Chronic | ICD-10-CM

## 2022-11-07 PROCEDURE — 99214 OFFICE O/P EST MOD 30 MIN: CPT | Performed by: NURSE PRACTITIONER

## 2022-11-07 PROCEDURE — G8427 DOCREV CUR MEDS BY ELIG CLIN: HCPCS | Performed by: NURSE PRACTITIONER

## 2022-11-07 PROCEDURE — 3017F COLORECTAL CA SCREEN DOC REV: CPT | Performed by: NURSE PRACTITIONER

## 2022-11-07 ASSESSMENT — SLEEP AND FATIGUE QUESTIONNAIRES
HOW LIKELY ARE YOU TO NOD OFF OR FALL ASLEEP WHILE WATCHING TV: 1
HOW LIKELY ARE YOU TO NOD OFF OR FALL ASLEEP WHILE LYING DOWN TO REST IN THE AFTERNOON WHEN CIRCUMSTANCES PERMIT: 3
HOW LIKELY ARE YOU TO NOD OFF OR FALL ASLEEP WHEN YOU ARE A PASSENGER IN A CAR FOR AN HOUR WITHOUT A BREAK: 1
HOW LIKELY ARE YOU TO NOD OFF OR FALL ASLEEP WHILE SITTING INACTIVE IN A PUBLIC PLACE: 0
HOW LIKELY ARE YOU TO NOD OFF OR FALL ASLEEP WHILE SITTING QUIETLY AFTER LUNCH WITHOUT ALCOHOL: 0
HOW LIKELY ARE YOU TO NOD OFF OR FALL ASLEEP WHILE SITTING AND READING: 1
ESS TOTAL SCORE: 6
HOW LIKELY ARE YOU TO NOD OFF OR FALL ASLEEP IN A CAR, WHILE STOPPED FOR A FEW MINUTES IN TRAFFIC: 0
HOW LIKELY ARE YOU TO NOD OFF OR FALL ASLEEP WHILE SITTING AND TALKING TO SOMEONE: 0

## 2022-11-07 NOTE — ASSESSMENT & PLAN NOTE
Chronic-Stable: Reviewed and analyzed results of physiologic download from patient's machine and reviewed with patient. Supplies and parts as needed for his machine. These are medically necessary. Limit caffeine use after 3pm. Based on the analyzed data will change following settings: P min increased to 8. Ramp increased to 8. Pressure changes sent via machine modem. Will trial pressure increase for comfort. Encouraged him to work with his DME on mask fit and comfort. Discussed trying a mask liner. Provided resources for him to view different styles of masks and mask liners. Discussed weight loss and its effects on sleep apnea. Will pull machine data in 1 month to review. Will see him back in 1 year. Encouraged him to contact the office with any questions or concerns.

## 2022-11-07 NOTE — PROGRESS NOTES
Diagnosis: [x] SAL (G47.33) [] CSA (G47.31) [] Apnea (G47.30)   Length of Need: [x] 15 Months [] 99 Months [] Other:   Machine (JAILENE!): [] Respironics Dream Station      Auto [] ResMed AirSense     Auto [] Other:     []  CPAP () [] Bilevel ()   Mode: [] Auto [] Spontaneous    Mode: [] Auto [] Spontaneous             Comfort Settings:      Humidifier: [] Heated ()        [x] Water chamber replacement ()/ 1 per 6 months        Mask:   [] Nasal () /1 per 3 months [x] Full Face () /1 per 3 months   [] Patient choice -Size and fit mask [x] Patient Choice - Size and fit mask   [] Dispense: [] Dispense:   [] Headgear () / 1 per 3 months [x] Headgear () / 1 per 3 months   [] Replacement Nasal Cushion ()/2 per month [x] Interface Replacement ()/1 per month   [] Replacement Nasal Pillows ()/2 per month         Tubing: [x] Heated ()/1 per 3 months    [] Standard ()/1 per 3 months [] Other:           Filters: [x] Non-disposable ()/1 per 6 months     [x] Ultra-Fine, Disposable ()/2 per month        Miscellaneous: [] Chin Strap ()/ 1 per 6 months [] O2 bleed-in:        LPM   [] Oxymetry on CPAP/Bilevel []  Other:         Start Order Date: 11/07/22    MEDICAL JUSTIFICATION:  I, the undersigned, certify that the above prescribed supplies are medically necessary for this patients wellbeing. In my opinion, the supplies are both reasonable and necessary in reference to accepted standards of medicalpractice in treatment of this patients condition. Conchis Wu NP    NPI: [id number]       Order Signed Date: 11/07/22  [address] [facility]  Pulmonary, Sleep, and Critical Care    Pulmonary, Sleep, and Critical Care  [address] [place] [address].  [facility], [address] [facility] , [address] [facility]                                    [facility]  Phone: [phone]    Fax: Claudette Maldonado [address]  1970  [facility] [address]  [phone] (home)   [phone] (mobile)      Insurance Info (confirm with patient if correct):  Payer/Plan Subscr  Sex Relation Sub.  Ins. ID Effective Group Num

## 2022-11-07 NOTE — PROGRESS NOTES
Diagnosis: [x] SAL (G47.33) [] CSA (G47.31) [] Apnea (G47.30)   Length of Need: [x] 15 Months [] 99 Months [] Other:   Machine (JAILENE!): [] Respironics Dream Station      Auto [] ResMed AirSense     Auto [] Other:     []  CPAP () [] Bilevel ()   Mode: [] Auto [] Spontaneous    Mode: [] Auto [] Spontaneous            Comfort Settings:      Humidifier: [] Heated ()        [x] Water chamber replacement ()/ 1 per 6 months        Mask:   [x] Nasal () /1 per 3 months [] Full Face () /1 per 3 months   [x] Patient choice -Size and fit mask [] Patient Choice - Size and fit mask   [] Dispense: [] Dispense:   [x] Headgear () / 1 per 3 months [] Headgear () / 1 per 3 months   [x] Replacement Nasal Cushion ()/2 per month [] Interface Replacement ()/1 per month   [x] Replacement Nasal Pillows ()/2 per month         Tubing: [x] Heated ()/1 per 3 months    [] Standard ()/1 per 3 months [] Other:           Filters: [x] Non-disposable ()/1 per 6 months     [x] Ultra-Fine, Disposable ()/2 per month        Miscellaneous: [] Chin Strap ()/ 1 per 6 months [] O2 bleed-in:        LPM   [] Oxymetry on CPAP/Bilevel []  Other:         Start Order Date: 11/07/22    MEDICAL JUSTIFICATION:  I, the undersigned, certify that the above prescribed supplies are medically necessary for this patients wellbeing. In my opinion, the supplies are both reasonable and necessary in reference to accepted standards of medicalpractice in treatment of this patients condition. Echo Aragon NP    NPI: [id number]       Order Signed Date: 11/07/22  350 [facility]  Pulmonary, Sleep, and Critical Care    Pulmonary, Sleep, and Critical Care  [facility] [address] [place] [address].  [facility], 152 [facility] , 800 [facility]  Phone: [phone]    Fax: Claudette Maldonado [address]  1970  [facility] [address]  [phone] (home)   [phone] (mobile)      Insurance Info (confirm with patient if correct):  Payer/Plan Subscr  Sex Relation Sub.  Ins. ID Effective Group Num

## 2022-11-07 NOTE — PROGRESS NOTES
Diagnosis: [x] SAL (G47.33) [] CSA (G47.31) [] Apnea (G47.30)   Length of Need: [x] 15 Months [] 99 Months [] Other:   Machine (JAILENE!): [] Respironics Dream Station      Auto [] ResMed AirSense     Auto [] Other:     []  CPAP () [] Bilevel ()   Mode: [] Auto [] Spontaneous    Mode: [] Auto [] Spontaneous             Comfort Settings:      Humidifier: [] Heated ()        [x] Water chamber replacement ()/ 1 per 6 months        Mask:   [] Nasal () /1 per 3 months [x] Full Face () /1 per 3 months   [] Patient choice -Size and fit mask [x] Patient Choice - Size and fit mask   [] Dispense: [] Dispense:   [] Headgear () / 1 per 3 months [x] Headgear () / 1 per 3 months   [] Replacement Nasal Cushion ()/2 per month [x] Interface Replacement ()/1 per month   [] Replacement Nasal Pillows ()/2 per month         Tubing: [x] Heated ()/1 per 3 months    [] Standard ()/1 per 3 months [] Other:           Filters: [x] Non-disposable ()/1 per 6 months     [x] Ultra-Fine, Disposable ()/2 per month        Miscellaneous: [] Chin Strap ()/ 1 per 6 months [] O2 bleed-in:        LPM   [] Oxymetry on CPAP/Bilevel []  Other:         Start Order Date: 11/07/22    MEDICAL JUSTIFICATION:  I, the undersigned, certify that the above prescribed supplies are medically necessary for this patients wellbeing. In my opinion, the supplies are both reasonable and necessary in reference to accepted standards of medicalpractice in treatment of this patients condition. Kandis Stinson NP    NPI: [id number]       Order Signed Date: 11/07/22  [address] [facility]  Pulmonary, Sleep, and Critical Care    Pulmonary, Sleep, and Critical Care  [address] [place] [address].  [facility], 152 [facility] , 800 [facility]  Phone: [phone]    Fax: Claudette Maldonado [address]  1970  [facility] [address]  [phone] (home)   [phone] (mobile)      Insurance Info (confirm with patient if correct):  Payer/Plan Subscr  Sex Relation Sub.  Ins. ID Effective Group Num

## 2022-11-07 NOTE — ASSESSMENT & PLAN NOTE
Chronic-not stable: Improvin pound weight loss. Discussed importance of treating obstructive sleep apnea and getting sufficient sleep to assist with weight control. Encouraged him to work on weight loss through diet and exercise. Recommended DASH or Mediterranean diets.

## 2022-11-07 NOTE — PROGRESS NOTES
Analy Barry [address], [address] [facility]- [phone]   [facility] Chris Kennedy. [address] [place] [address]. Fer[address] Gusman [address] [phone]     Video Visit- Follow up    Jaswinder Andres, was evaluated through a synchronous (real-time) audio-video  encounter. The patient (or guardian if applicable) is aware that this is a billable  service, which includes applicable co-pays. This Virtual Visit was conducted with  patient's (and/or legal guardian's) consent. The visit was conducted pursuant to  the emergency declaration under the [address] [facility], [address] [place] [address] [facility] authority and the [employer] and  [employer] Act. Patient identification was verified,  and a caregiver was present when appropriate. The patient was located in a  state where the provider was licensed to provide care. Assessment/Plan:      1. Obstructive sleep apnea syndrome  Assessment & Plan:   Chronic-Stable: Reviewed and analyzed results of physiologic download from patient's machine and reviewed with patient. Supplies and parts as needed for his machine. These are medically necessary. Limit caffeine use after 3pm. Based on the analyzed data will change following settings: P min increased to 8. Ramp increased to 8. Pressure changes sent via machine modem. Will trial pressure increase for comfort. Encouraged him to work with his DME on mask fit and comfort. Discussed trying a mask liner. Provided resources for him to view different styles of masks and mask liners. Discussed weight loss and its effects on sleep apnea. Will pull machine data in 1 month to review. Will see him back in 1 year. Encouraged him to contact the office with any questions or concerns. 2. Non morbid obesity, unspecified obesity type  Assessment & Plan:  Chronic-not stable: Improvin pound weight loss.  Discussed importance of treating obstructive sleep apnea and getting sufficient sleep to assist with weight control. Encouraged him to work on weight loss through diet and exercise. Recommended DASH or Mediterranean diets. Reviewed, analyzed, and documented physiologic data from patient's PAP machine. This information was analyzed to assess complexity and medical decision making in regards to further testing and management. The primary encounter diagnosis was Obstructive sleep apnea syndrome. A diagnosis of Non morbid obesity, unspecified obesity type was also pertinent to this visit. The chronic medical conditions listed are directly related to the primary diagnosis listed above. The management of the primary diagnosis affects the secondary diagnosis and vice versa. Subjective:     Patient ID: Gerry Wilder is a 46 y.o. male. Chief Complaint   Patient presents with    Sleep Apnea       Subjective   HPI:    Machine Modem/Download Info:  Compliance (hours/night): 6.4 hrs/night  % of nights >= 4 hrs: 100 %  Download AHI (/hour): 1.6 /HR  Average CPAP Pressure : 9.7 cmH2O      APAP - Settings  Pressure Min: 6 cmH2O  Pressure Max: 16 cmH2O                 Comfort Settings  Humidity Level (0-8): 0  Flex/EPR (0-3): 3 PAP Mask  Mask Type: Full Face mask     Gerry Wilder reports he is doing well with his machine. The pressure on his machine feels a little low at times. He feels he is sleeping well and he is waking rested. He has lost about 40 pounds since August. he denies headaches, congestion, nosebleeds, dryness, aerophagia, or drowsiness while driving. Some trouble with mask leak. He replaces his supplies as recommended. He has not contacted his DME to try a different mask. DME [facility]    [place] - [place] Sleepiness Score: 6    Current Outpatient Medications   Medication Instructions    betamethasone valerate (VALISONE) 0.1 % cream Apply topically 2 times daily. ciclopirox (PENLAC) 8 % solution Apply topically nightly. Electronically signed by MI Cintron on 11/7/2022 at 2:39 PM

## 2022-12-06 ENCOUNTER — TELEPHONE (OUTPATIENT)
Dept: PULMONOLOGY | Age: 52
End: 2022-12-06

## 2022-12-07 NOTE — TELEPHONE ENCOUNTER
Machine data report obtained and reviewed due to pressure adjustments made to his machine during his visit on 11/7/2022. Compliance is good and SAL appears to be controlled with an AHI of 1/hr.

## 2022-12-08 ENCOUNTER — OFFICE VISIT (OUTPATIENT)
Dept: FAMILY MEDICINE CLINIC | Age: 52
End: 2022-12-08

## 2022-12-08 VITALS
SYSTOLIC BLOOD PRESSURE: 126 MMHG | WEIGHT: 202 LBS | HEART RATE: 77 BPM | BODY MASS INDEX: 31.4 KG/M2 | DIASTOLIC BLOOD PRESSURE: 80 MMHG | TEMPERATURE: 98.3 F | OXYGEN SATURATION: 98 %

## 2022-12-08 DIAGNOSIS — Z12.11 SCREENING FOR MALIGNANT NEOPLASM OF COLON: ICD-10-CM

## 2022-12-08 DIAGNOSIS — Z13.0 SCREENING FOR DEFICIENCY ANEMIA: ICD-10-CM

## 2022-12-08 DIAGNOSIS — M25.471 PAIN AND SWELLING OF RIGHT ANKLE: Primary | ICD-10-CM

## 2022-12-08 DIAGNOSIS — Z13.1 SCREENING FOR DIABETES MELLITUS: ICD-10-CM

## 2022-12-08 DIAGNOSIS — M25.571 PAIN AND SWELLING OF RIGHT ANKLE: Primary | ICD-10-CM

## 2022-12-08 DIAGNOSIS — Z00.00 WELL ADULT EXAM: ICD-10-CM

## 2022-12-08 DIAGNOSIS — Z13.29 SCREENING FOR HYPOTHYROIDISM: ICD-10-CM

## 2022-12-08 DIAGNOSIS — E78.2 MIXED HYPERLIPIDEMIA: ICD-10-CM

## 2022-12-08 ASSESSMENT — PATIENT HEALTH QUESTIONNAIRE - PHQ9
1. LITTLE INTEREST OR PLEASURE IN DOING THINGS: 0
SUM OF ALL RESPONSES TO PHQ QUESTIONS 1-9: 0
SUM OF ALL RESPONSES TO PHQ QUESTIONS 1-9: 0
2. FEELING DOWN, DEPRESSED OR HOPELESS: 0
SUM OF ALL RESPONSES TO PHQ QUESTIONS 1-9: 0
SUM OF ALL RESPONSES TO PHQ9 QUESTIONS 1 & 2: 0
SUM OF ALL RESPONSES TO PHQ QUESTIONS 1-9: 0

## 2022-12-08 ASSESSMENT — ENCOUNTER SYMPTOMS
SHORTNESS OF BREATH: 0
DIARRHEA: 0
VOMITING: 0
COUGH: 0
NAUSEA: 0

## 2022-12-08 NOTE — PROGRESS NOTES
Migel Cain  : 1970  Encounter date: 2022    This is a 46 y.o. male who presents with  Chief Complaint   Patient presents with    Ankle Pain     2 weeks prior was walking and stepped on it side ways- hurt for a few but resolved but over the past few days has worsen over the past few days. Pain radiates from outer ankle up leg. Hurts worse when shoes are on. Swelling has noticed as well. Took Aspirin yesterday with some relief. History of present illness:    HPI   Presents to clinic today with concerns for right ankle pain that started approximately 2 weeks prior after rolling ankle. Reports initially had some minimal discomfort with no swelling - felt it resolved in just a few days. Reports then over the past day started with swelling and pain. Rates pain at a 8/10 yesterday and today has improved to a 6/10. Swelling today has improved. Took Naproxen yesterday with a little relief. Denies any previous surgery to ankle. Can bear weight, but some discomfort. Requesting repeat labs for his annual physical as he did not complete after his last visit in 2021. Also needs updated referral for Coloscopy. Allergies   Allergen Reactions    Erythromycin      Stomach cramps     No current outpatient medications on file. No current facility-administered medications for this visit. Review of Systems   Constitutional:  Negative for activity change, appetite change, chills, fatigue and fever. Respiratory:  Negative for cough and shortness of breath. Cardiovascular:  Negative for chest pain and palpitations. Gastrointestinal:  Negative for diarrhea, nausea and vomiting. Past medical, surgical, family and social history were reviewed and updated with the patient.     Objective:    /80 (Site: Right Upper Arm, Position: Sitting, Cuff Size: Medium Adult)   Pulse 77   Temp 98.3 °F (36.8 °C)   Wt 202 lb (91.6 kg)   SpO2 98%   BMI 31.40 kg/m²   Weight: 202 lb (91.6 kg) BP Readings from Last 3 Encounters:   12/08/22 126/80   06/29/21 126/84   01/03/20 114/84     Wt Readings from Last 3 Encounters:   12/08/22 202 lb (91.6 kg)   06/29/21 231 lb 4 oz (104.9 kg)   01/03/20 216 lb 6.4 oz (98.2 kg)     Physical Exam  Constitutional:       General: He is not in acute distress. Appearance: He is well-developed. HENT:      Head: Normocephalic and atraumatic. Cardiovascular:      Rate and Rhythm: Normal rate and regular rhythm. Heart sounds: Normal heart sounds, S1 normal and S2 normal.   Pulmonary:      Effort: Pulmonary effort is normal. No respiratory distress. Breath sounds: Normal breath sounds. Musculoskeletal:      Right ankle: Swelling present. No deformity or ecchymosis. Tenderness present over the lateral malleolus. Decreased range of motion. Skin:     General: Skin is warm and dry. Neurological:      Mental Status: He is alert and oriented to person, place, and time. Psychiatric:         Thought Content: Thought content normal.         Judgment: Judgment normal.     Assessment/Plan    1. Pain and swelling of right ankle  Complete Xray. Continue with PRN Tylenol/Ibuprofen/Naproxen to help with pain/swelling. Recommend compression sleeve. Dependent on xray may need to refer to Ortho, or if no improvement over the next 1-2 weeks. - XR ANKLE RIGHT (MIN 3 VIEWS); Future    2. Well adult exam  Due for labs. Schedule follow up with Dr. Amrita Vicente anytime.   - CBC with Auto Differential; Future  - Comprehensive Metabolic Panel, Fasting; Future  - Lipid, Fasting; Future  - TSH with Reflex; Future    3. Screening for malignant neoplasm of colon  - AFL - Go, Viktoria Gilford, MD, Gastroenterology, [facility]    4. Mixed hyperlipidemia  - Comprehensive Metabolic Panel, Fasting; Future  - Lipid, Fasting; Future    5. Screening for diabetes mellitus    6. Screening for hypothyroidism  - TSH with Reflex; Future    7.  Screening for deficiency anemia  - CBC with Auto Differential; Future     Milton Venkata was counseled regarding symptoms of current diagnosis, course and complications of disease if inadequately treated. Discussed side effects of medications, diagnosis, treatment options, and prognosis along with risks, benefits, complications, and alternatives of treatment including labs, imaging and other studies/treatment targets and goals. He verbalized understanding of instructions and counseling. Return in about 4 weeks (around 1/5/2023) for annual exan, chronic health conditions. Medical decision making of moderate complexity.

## 2022-12-10 LAB
ALBUMIN SERPL-MCNC: 4.4 G/DL (ref 3.5–5.7)
ALP BLD-CCNC: 49 IU/L (ref 35–135)
ALT SERPL-CCNC: 15 IU/L (ref 10–60)
ANION GAP SERPL CALCULATED.3IONS-SCNC: 5 MMOL/L (ref 6–18)
AST SERPL-CCNC: 15 IU/L (ref 10–40)
BASOPHILS ABSOLUTE: 0 THOU/MCL (ref 0–0.2)
BASOPHILS ABSOLUTE: 1 %
BILIRUB SERPL-MCNC: 0.6 MG/DL (ref 0–1.2)
BUN BLDV-MCNC: 23 MG/DL (ref 8–26)
CALCIUM SERPL-MCNC: 9.6 MG/DL (ref 8.5–10.4)
CHLORIDE BLD-SCNC: 106 MEQ/L (ref 98–111)
CHOLESTEROL, TOTAL: 178 MG/DL
CO2: 29 MMOL/L (ref 21–31)
CREAT SERPL-MCNC: 1.1 MG/DL (ref 0.7–1.3)
EGFR (CKD-EPI): 81 ML/MIN/1.73 M2
EOSINOPHILS ABSOLUTE: 0.6 THOU/MCL (ref 0.03–0.45)
EOSINOPHILS RELATIVE PERCENT: 9 %
GLUCOSE BLD-MCNC: 89 MG/DL (ref 70–99)
HCT VFR BLD CALC: 43.5 % (ref 40–50)
HDLC SERPL-MCNC: 45 MG/DL
HEMOGLOBIN: 15 G/DL (ref 13.5–16.5)
LDL CHOLESTEROL CALCULATED: 113 MG/DL
LYMPHOCYTES ABSOLUTE: 1.6 THOU/MCL (ref 1–4)
LYMPHOCYTES RELATIVE PERCENT: 26 %
MCH RBC QN AUTO: 31.4 PG (ref 27–33)
MCHC RBC AUTO-ENTMCNC: 34.6 G/DL (ref 32–36)
MCV RBC AUTO: 90.7 FL (ref 82–97)
MONOCYTES # BLD: 9 %
MONOCYTES ABSOLUTE: 0.6 THOU/MCL (ref 0.2–0.9)
NEUTROPHILS ABSOLUTE: 3.4 THOU/MCL (ref 1.8–7.7)
NONHDLC SERPL-MCNC: 133 MG/DL
PDW BLD-RTO: 13.5 % (ref 12.3–17)
PLATELET # BLD: 224 THOU/MCL (ref 140–375)
PMV BLD AUTO: 6.9 FL (ref 7.4–11.5)
POTASSIUM SERPL-SCNC: 4.4 MEQ/L (ref 3.6–5.1)
RBC # BLD: 4.79 MIL/MCL (ref 4.4–5.8)
SEG NEUTROPHILS: 55 %
SODIUM BLD-SCNC: 140 MEQ/L (ref 135–145)
TOTAL PROTEIN: 7.1 G/DL (ref 6–8)
TRIGL SERPL-MCNC: 102 MG/DL
TSH ULTRASENSITIVE: 1.41 MCIU/ML (ref 0.27–4.2)
WBC # BLD: 6.3 THOU/MCL (ref 3.6–10.5)

## 2023-01-30 ENCOUNTER — OFFICE VISIT (OUTPATIENT)
Dept: FAMILY MEDICINE CLINIC | Age: 53
End: 2023-01-30
Payer: COMMERCIAL

## 2023-01-30 VITALS
WEIGHT: 202 LBS | TEMPERATURE: 97.2 F | OXYGEN SATURATION: 98 % | DIASTOLIC BLOOD PRESSURE: 82 MMHG | SYSTOLIC BLOOD PRESSURE: 122 MMHG | HEIGHT: 68 IN | HEART RATE: 65 BPM | BODY MASS INDEX: 30.62 KG/M2

## 2023-01-30 DIAGNOSIS — Z00.00 WELL ADULT EXAM: Primary | ICD-10-CM

## 2023-01-30 DIAGNOSIS — G47.33 OBSTRUCTIVE SLEEP APNEA SYNDROME: ICD-10-CM

## 2023-01-30 DIAGNOSIS — B35.1 TINEA UNGUIUM: ICD-10-CM

## 2023-01-30 DIAGNOSIS — S93.401D SPRAIN OF RIGHT ANKLE, UNSPECIFIED LIGAMENT, SUBSEQUENT ENCOUNTER: ICD-10-CM

## 2023-01-30 PROCEDURE — 99396 PREV VISIT EST AGE 40-64: CPT | Performed by: FAMILY MEDICINE

## 2023-01-30 PROCEDURE — G8484 FLU IMMUNIZE NO ADMIN: HCPCS | Performed by: FAMILY MEDICINE

## 2023-01-30 RX ORDER — TERBINAFINE HYDROCHLORIDE 250 MG/1
250 TABLET ORAL DAILY
Qty: 84 TABLET | Refills: 0 | Status: SHIPPED | OUTPATIENT
Start: 2023-01-30 | End: 2023-04-24

## 2023-01-30 ASSESSMENT — PATIENT HEALTH QUESTIONNAIRE - PHQ9
SUM OF ALL RESPONSES TO PHQ QUESTIONS 1-9: 0
2. FEELING DOWN, DEPRESSED OR HOPELESS: 0
1. LITTLE INTEREST OR PLEASURE IN DOING THINGS: 0
SUM OF ALL RESPONSES TO PHQ QUESTIONS 1-9: 0
SUM OF ALL RESPONSES TO PHQ9 QUESTIONS 1 & 2: 0

## 2023-01-30 NOTE — PROGRESS NOTES
History and Physical      Raeann Bar  YOB: 1970    Date of Service:  1/30/2023    Chief Complaint:   Raeann Bar is a 46 y.o. male who  presents for physical examination. HPI: Patient states the left foot, his toenail has not grown back in years. He states his ankle pain on the right side has not gotten much better. Patient sprained his ankle a month ago. He is off anti-inflammatory medications. He was using a knee support but is not using this at this time. He still having tenderness on the lateral aspect of his ankle with certain motions and activities. He feels all the swelling is resolved at this point time. He continues using CPAP for sleep apnea. Patient is gone on a weight loss diet over the last year and is lost almost 30 pounds.     Wt Readings from Last 3 Encounters:   01/30/23 202 lb (91.6 kg)   12/08/22 202 lb (91.6 kg)   06/29/21 231 lb 4 oz (104.9 kg)     BP Readings from Last 3 Encounters:   01/30/23 122/82   12/08/22 126/80   06/29/21 126/84       Patient Active Problem List   Diagnosis    Non morbid obesity, unspecified obesity type    Obstructive sleep apnea syndrome       Allergies   Allergen Reactions    Erythromycin      Stomach cramps     No outpatient medications have been marked as taking for the 1/30/23 encounter (Office Visit) with Carlene Farrar MD.       Past Medical History:   Diagnosis Date    Sleep apnea      Past Surgical History:   Procedure Laterality Date    DENTAL SURGERY      FOOT SURGERY      pencil lead removal    SHOULDER ARTHROSCOPY Left 01/29/2016    Subacromial Decompression Labral Debridement Distal Clavicle Excision    SHOULDER ARTHROSCOPY Right 06/24/2016     RIGHT SHOULDER ARTHROSCOPE, SUBACROMIAL DECOMPRESSION, DISTAL     Family History   Problem Relation Age of Onset    Hypertension Mother     Diabetes Mother     Alcohol Abuse Father     Diabetes Maternal Aunt     Hypertension Maternal Grandmother     Diabetes Maternal Grandmother      Social History     Socioeconomic History    Marital status:      Spouse name: Not on file    Number of children: Not on file    Years of education: Not on file    Highest education level: Not on file   Occupational History    Not on file   Tobacco Use    Smoking status: Never    Smokeless tobacco: Never   Vaping Use    Vaping Use: Never used   Substance and Sexual Activity    Alcohol use: No    Drug use: No    Sexual activity: Not on file   Other Topics Concern    Not on file   Social History Narrative    Not on file     Social Determinants of Health     Financial Resource Strain: Not on file   Food Insecurity: Not on file   Transportation Needs: Not on file   Physical Activity: Not on file   Stress: Not on file   Social Connections: Not on file   Intimate Partner Violence: Not on file   Housing Stability: Not on file       Self-testicular exams: Yes  Sexual activity: single partner, contraception - none   Last eye exam: 2020, normal  Exercise: walks 3 time(s) per week    Review of Systems:  A comprehensive review of systems was negative except for what was noted in the HPI. Physical Exam:   Vitals:    01/30/23 1051   BP: 122/82   Site: Right Upper Arm   Position: Sitting   Cuff Size: Large Adult   Pulse: 65   Temp: 97.2 °F (36.2 °C)   TempSrc: Infrared   SpO2: 98%   Weight: 202 lb (91.6 kg)   Height: 5' 7.75\" (1.721 m)     Body mass index is 30.94 kg/m². Constitutional: He is oriented to person, place, and time. He appears well-developed and well-nourished. No distress. HEENT:   Head: Normocephalic and atraumatic. Right Ear: Tympanic membrane, external ear and ear canal normal.   Left Ear: Tympanic membrane, external ear and ear canal normal.   Mouth/Throat: Oropharynx is clear and moist and mucous membranes are normal. No oropharyngeal exudate or posterior oropharyngeal erythema. He has no cervical adenopathy.    Eyes: Conjunctivae and extraocular motions are normal. Pupils are equal, round, and reactive to light. Neck:  Supple. No JVD present. Carotid bruit is not present. No mass and no thyromegaly present. Cardiovascular: Normal rate, regular rhythm, normal heart sounds and intact distal pulses. Exam reveals no gallop and no friction rub. No murmur heard. Pulmonary/Chest: Effort normal and breath sounds normal. No respiratory distress. He has no wheezes, rhonchi or rales. Abdominal: Soft, non-tender. Bowel sounds and aorta are normal. There is no organomegaly, mass or bruit. Genitourinary:  genitals normal without hernia or inguinal adenopathy. Musculoskeletal: Normal range of motion, no synovitis. He exhibits no edema. Patient does have some tenderness on the anterior talofibular ligament. Otherwise full range of motion with no other ligament misgivings. Neurological: He is alert and oriented to person, place, and time. He has normal reflexes. No cranial nerve deficit. Coordination normal.   Skin: Skin is warm, dry and intact. No suspicious lesions are noted. Left great toenail with thickening of the entire nail consistent with tinea. Psychiatric: He has a normal mood and affect.  His speech is normal and behavior is normal. Judgment, cognition and memory are normal.     Preventive Care:  Health Maintenance   Topic Date Due    HIV screen  Never done    Hepatitis C screen  Never done    Shingles vaccine (1 of 2) Never done    COVID-19 Vaccine (3 - Booster for Pfizer series) 06/03/2021    Flu vaccine (1) 08/01/2022    Depression Screen  12/08/2023    Lipids  12/10/2027    Colorectal Cancer Screen  01/27/2028    DTaP/Tdap/Td vaccine (4 - Td or Tdap) 10/29/2032    Hepatitis A vaccine  Aged Out    Hib vaccine  Aged Out    Meningococcal (ACWY) vaccine  Aged Out    Pneumococcal 0-64 years Vaccine  Aged [facility] plan of care for Deanaen [place]        1/30/2023           Preventive Measures Status       Recommendations for screening   Prostate Cancer Screen  No results found for: PSA   Repeat yearly    Colon Cancer Screen  Last colonoscopy: 2018 Repeat in 10 years   Diabetes Screen  Glucose (mg/dL)   Date Value   12/10/2022 89    Repeat yearly   Cholesterol Screen  Lab Results   Component Value Date    CHOL 178 12/10/2022    TRIG 102 12/10/2022    HDL 45 (L) 12/10/2022    LDLCALC 113 12/10/2022    Repeat every 2 years   Aspirin for Cardiovascular Prevention   No Not indicated   Weight: Body mass index is 30.94 kg/m². 5' 7.75\" (1.721 m)202 lb (91.6 kg)  Your BMI is 25 or greater, which indicates that you are overweight   Living Will: No Additional information provided    Recommended Immunizations    Immunization History   Administered Date(s) Administered    COVID-19, PFIZER PURPLE top, DILUTE for use, (age 15 y+), 30mcg/0.3mL 03/19/2021, 04/08/2021    DT (pediatric) 03/27/2000    Influenza Vaccine, unspecified formulation 11/01/2015, 10/10/2016    Influenza Virus Vaccine 10/22/2013, 10/22/2014, 09/01/2019    Tdap (Boostrix, Adacel) 11/22/2013, 10/29/2022    Influenza vaccine:  recommended every fall         Other Recommendations   Try to get at least 30 minutes of exercise 3-4 days per week             Assessment/Plan:  Cole Jordan was seen today for annual exam.    Diagnoses and all orders for this visit:    Well adult exam    Tinea unguium    Obstructive sleep apnea syndrome    Sprain of right ankle, unspecified ligament, subsequent encounter    Other orders  -     terbinafine (LAMISIL) 250 MG tablet; Take 1 tablet by mouth daily      Reviewed labs and test results with patient.     Began patient on ankle exercising daily and advised this will take 12 to 15 weeks completely resolve    Patient is going to start oral antifungal medication for toenail    Continue using CPAP    Patient received counseling on the following healthy behaviors: nutrition and exercise     Patient given educational materials     Health maintenance updated    Discussed use, benefit, and side effects of prescribed medications. Barriers to medication compliance addressed. All patient questions answered. Pt voiced understanding. Patient needs RTC in one year. Please note that this chart was generated using Dragon dictation software. Although every effort was made to ensure the accuracy of this automated transcription, some errors in transcription may have occurred. Patient was evaluated and examined. I performed the physical examination and key/critical portions of the history were approved and edited.  I also confirm that the note above accurately reflects all work, treatment, procedures, and medical decision making performed by me, Jared Mijares M.D.

## 2023-11-07 ENCOUNTER — TELEMEDICINE (OUTPATIENT)
Dept: PULMONOLOGY | Age: 53
End: 2023-11-07
Payer: COMMERCIAL

## 2023-11-07 DIAGNOSIS — G47.33 OBSTRUCTIVE SLEEP APNEA SYNDROME: Primary | ICD-10-CM

## 2023-11-07 DIAGNOSIS — E66.9 NON MORBID OBESITY, UNSPECIFIED OBESITY TYPE: Chronic | ICD-10-CM

## 2023-11-07 PROCEDURE — 99214 OFFICE O/P EST MOD 30 MIN: CPT | Performed by: NURSE PRACTITIONER

## 2023-11-07 PROCEDURE — 3017F COLORECTAL CA SCREEN DOC REV: CPT | Performed by: NURSE PRACTITIONER

## 2023-11-07 PROCEDURE — G8427 DOCREV CUR MEDS BY ELIG CLIN: HCPCS | Performed by: NURSE PRACTITIONER

## 2023-11-07 ASSESSMENT — SLEEP AND FATIGUE QUESTIONNAIRES
HOW LIKELY ARE YOU TO NOD OFF OR FALL ASLEEP WHILE SITTING AND READING: 1
HOW LIKELY ARE YOU TO NOD OFF OR FALL ASLEEP WHEN YOU ARE A PASSENGER IN A CAR FOR AN HOUR WITHOUT A BREAK: 1
HOW LIKELY ARE YOU TO NOD OFF OR FALL ASLEEP WHILE LYING DOWN TO REST IN THE AFTERNOON WHEN CIRCUMSTANCES PERMIT: 3
ESS TOTAL SCORE: 7
HOW LIKELY ARE YOU TO NOD OFF OR FALL ASLEEP WHILE SITTING AND TALKING TO SOMEONE: 0
HOW LIKELY ARE YOU TO NOD OFF OR FALL ASLEEP WHILE WATCHING TV: 1
HOW LIKELY ARE YOU TO NOD OFF OR FALL ASLEEP WHILE SITTING QUIETLY AFTER LUNCH WITHOUT ALCOHOL: 0
HOW LIKELY ARE YOU TO NOD OFF OR FALL ASLEEP IN A CAR, WHILE STOPPED FOR A FEW MINUTES IN TRAFFIC: 0
HOW LIKELY ARE YOU TO NOD OFF OR FALL ASLEEP WHILE SITTING INACTIVE IN A PUBLIC PLACE: 1

## 2023-11-07 NOTE — PROGRESS NOTES
[address] Jose Shock  1970  [address] [facility],  [address] [facility]  [phone] (home)   [phone] (mobile)      Insurance Info (confirm with patient if correct):  Payer/Plan Subscr  Sex Relation Sub.  Ins. ID Effective Group Num

## 2023-11-07 NOTE — PROGRESS NOTES
Ousmane Deng CNP  Ashli Bergmanxi PHILLIPS [facility] [address] [facility] [address], [address] [facility] [phone]   [address] [place] [address], [address] [place] [address] [phone]     Video Visit- Follow up      Assessment/Plan:      1. Obstructive sleep apnea syndrome  Assessment & Plan:  Chronic-Stable: Reviewed and analyzed results of physiologic download from patient's machine and reviewed with patient. Supplies and parts as needed for his machine. These are medically necessary. Limit caffeine use after 3pm. Based on the analyzed data will continue with current settings. Stable on his machine at current settings, getting benefit from the use, and having minimal side effects. Discussed could trial pressure increase for sleep maintenance, he declines at this time. Encouraged him to work with his DME if he would like to try a different style of mask. Consider using the humidifier on his machine for oral and nasal dryness. Will see him back in 1 year. Encouraged him to contact the office with any questions or concerns. 2. Non morbid obesity, unspecified obesity type  Assessment & Plan:  Chronic-not stable:  Discussed importance of treating obstructive sleep apnea and getting sufficient sleep to assist with weight control. Discussed weight gain and/or weight loss may require adjustments to machine settings. Encouraged him to work on weight loss through diet and exercise. Reviewed, analyzed, and documented physiologic data from patient's PAP machine. This information was analyzed to assess complexity and medical decision making in regards to further testing and management. The primary encounter diagnosis was Obstructive sleep apnea syndrome. A diagnosis of Non morbid obesity, unspecified obesity type was also pertinent to this visit. The chronic medical conditions listed are directly related to the primary diagnosis listed above.   The management of the primary

## 2023-11-07 NOTE — ASSESSMENT & PLAN NOTE
Chronic-Stable: Reviewed and analyzed results of physiologic download from patient's machine and reviewed with patient. Supplies and parts as needed for his machine. These are medically necessary. Limit caffeine use after 3pm. Based on the analyzed data will continue with current settings. Stable on his machine at current settings, getting benefit from the use, and having minimal side effects. Discussed could trial pressure increase for sleep maintenance, he declines at this time. Encouraged him to work with his DME if he would like to try a different style of mask. Discussed using the humidifier on his machine for oral and nasal dryness. Will see him back in 1 year. Encouraged him to contact the office with any questions or concerns.

## 2024-04-30 ENCOUNTER — OFFICE VISIT (OUTPATIENT)
Dept: FAMILY MEDICINE CLINIC | Age: 54
End: 2024-04-30
Payer: COMMERCIAL

## 2024-04-30 VITALS
RESPIRATION RATE: 12 BRPM | SYSTOLIC BLOOD PRESSURE: 118 MMHG | OXYGEN SATURATION: 98 % | HEIGHT: 68 IN | BODY MASS INDEX: 33.49 KG/M2 | DIASTOLIC BLOOD PRESSURE: 80 MMHG | HEART RATE: 72 BPM | WEIGHT: 221 LBS | TEMPERATURE: 98.6 F

## 2024-04-30 DIAGNOSIS — Z00.00 WELL ADULT EXAM: Primary | ICD-10-CM

## 2024-04-30 DIAGNOSIS — B35.1 TINEA UNGUIUM: ICD-10-CM

## 2024-04-30 DIAGNOSIS — G47.33 OBSTRUCTIVE SLEEP APNEA SYNDROME: ICD-10-CM

## 2024-04-30 DIAGNOSIS — M77.12 LATERAL EPICONDYLITIS OF LEFT ELBOW: ICD-10-CM

## 2024-04-30 DIAGNOSIS — N52.9 ERECTILE DYSFUNCTION, UNSPECIFIED ERECTILE DYSFUNCTION TYPE: ICD-10-CM

## 2024-04-30 PROCEDURE — 99396 PREV VISIT EST AGE 40-64: CPT | Performed by: FAMILY MEDICINE

## 2024-04-30 SDOH — ECONOMIC STABILITY: FOOD INSECURITY: WITHIN THE PAST 12 MONTHS, THE FOOD YOU BOUGHT JUST DIDN'T LAST AND YOU DIDN'T HAVE MONEY TO GET MORE.: NEVER TRUE

## 2024-04-30 SDOH — ECONOMIC STABILITY: FOOD INSECURITY: WITHIN THE PAST 12 MONTHS, YOU WORRIED THAT YOUR FOOD WOULD RUN OUT BEFORE YOU GOT MONEY TO BUY MORE.: NEVER TRUE

## 2024-04-30 SDOH — ECONOMIC STABILITY: INCOME INSECURITY: HOW HARD IS IT FOR YOU TO PAY FOR THE VERY BASICS LIKE FOOD, HOUSING, MEDICAL CARE, AND HEATING?: NOT HARD AT ALL

## 2024-04-30 SDOH — ECONOMIC STABILITY: HOUSING INSECURITY
IN THE LAST 12 MONTHS, WAS THERE A TIME WHEN YOU DID NOT HAVE A STEADY PLACE TO SLEEP OR SLEPT IN A SHELTER (INCLUDING NOW)?: NO

## 2024-04-30 ASSESSMENT — PATIENT HEALTH QUESTIONNAIRE - PHQ9
SUM OF ALL RESPONSES TO PHQ QUESTIONS 1-9: 0
2. FEELING DOWN, DEPRESSED OR HOPELESS: NOT AT ALL
1. LITTLE INTEREST OR PLEASURE IN DOING THINGS: NOT AT ALL
SUM OF ALL RESPONSES TO PHQ QUESTIONS 1-9: 0
SUM OF ALL RESPONSES TO PHQ9 QUESTIONS 1 & 2: 0
1. LITTLE INTEREST OR PLEASURE IN DOING THINGS: NOT AT ALL
SUM OF ALL RESPONSES TO PHQ9 QUESTIONS 1 & 2: 0
2. FEELING DOWN, DEPRESSED OR HOPELESS: NOT AT ALL

## 2024-04-30 NOTE — PROGRESS NOTES
this visit:    Well adult exam  -     Comprehensive Metabolic Panel; Future  -     Lipid Panel; Future  -     Testosterone, free, total; Future    Lateral epicondylitis of left elbow    Tinea unguium    Obstructive sleep apnea syndrome    Erectile dysfunction, unspecified erectile dysfunction type        Proceed with laboratory testing.  Adjustments of medication will be done after laboratory testing results available.    Information handout provided in regards to tennis elbow recommend patient use a tennis elbow strap daily for the next 3 to 4 weeks.  If he is not making headway at that time we discussed local cortisone injection for anti-inflammatory medications.    Continue using CPAP for sleep apnea    Discussed that he has chronic tinea involvement of the great toenail and we did discuss podiatry consultation.    Patient received counseling on the following healthy behaviors: nutrition and exercise     Patient given educational materials     Health maintenance updated    Discussed use, benefit, and side effects of prescribed medications.  Barriers to medication compliance addressed.  All patient questions answered.  Pt voiced understanding.     Patient needs RTC in one year.    Medical decision making of moderate complexity.    Please note that this chart was generated using Dragon dictation software. Although every effort was made to ensure the accuracy of this automated transcription, some errors in transcription may have occurred.    Patient was evaluated and examined. I performed the physical examination and key/critical portions of the history were approved and edited. I also confirm that the note above accurately reflects all work, treatment, procedures, and medical decision making performed by me, Brent Rawls M.D.

## 2024-09-09 ENCOUNTER — HOSPITAL ENCOUNTER (OUTPATIENT)
Age: 54
Discharge: HOME | End: 2024-09-09
Payer: COMMERCIAL

## 2024-09-09 DIAGNOSIS — Z00.00 WELL ADULT EXAM: ICD-10-CM

## 2024-09-09 LAB
ALBUMIN SERPL-MCNC: 4.5 G/DL (ref 3.4–5)
ALBUMIN/GLOB SERPL: 1.6 {RATIO} (ref 1.1–2.2)
ALP SERPL-CCNC: 47 U/L (ref 40–129)
ALT SERPL-CCNC: 18 U/L (ref 10–40)
ANION GAP SERPL CALCULATED.3IONS-SCNC: 11 MMOL/L (ref 3–16)
AST SERPL-CCNC: 21 U/L (ref 15–37)
BILIRUB SERPL-MCNC: 0.5 MG/DL (ref 0–1)
BUN SERPL-MCNC: 14 MG/DL (ref 7–20)
CALCIUM SERPL-MCNC: 9.3 MG/DL (ref 8.3–10.6)
CHLORIDE SERPL-SCNC: 102 MMOL/L (ref 99–110)
CHOLEST SERPL-MCNC: 162 MG/DL (ref 0–199)
CO2 SERPL-SCNC: 26 MMOL/L (ref 21–32)
CREAT SERPL-MCNC: 1.2 MG/DL (ref 0.9–1.3)
GFR SERPLBLD CREATININE-BSD FMLA CKD-EPI: 72 ML/MIN/{1.73_M2}
GLUCOSE SERPL-MCNC: 85 MG/DL (ref 70–99)
HDLC SERPL-MCNC: 38 MG/DL (ref 40–60)
LDLC SERPL CALC-MCNC: 107 MG/DL
POTASSIUM SERPL-SCNC: 4.3 MMOL/L (ref 3.5–5.1)
PROT SERPL-MCNC: 7.3 G/DL (ref 6.4–8.2)
SODIUM SERPL-SCNC: 139 MMOL/L (ref 136–145)
TRIGL SERPL-MCNC: 86 MG/DL (ref 0–150)
VLDLC SERPL CALC-MCNC: 17 MG/DL

## 2024-09-09 PROCEDURE — 80053 COMPREHEN METABOLIC PANEL: CPT

## 2024-09-09 PROCEDURE — 80061 LIPID PANEL: CPT

## 2024-09-09 PROCEDURE — 36415 COLL VENOUS BLD VENIPUNCTURE: CPT

## 2024-09-09 PROCEDURE — 84403 ASSAY OF TOTAL TESTOSTERONE: CPT

## 2024-09-09 PROCEDURE — 84270 ASSAY OF SEX HORMONE GLOBUL: CPT

## 2024-09-10 ENCOUNTER — OFFICE VISIT (OUTPATIENT)
Dept: FAMILY MEDICINE CLINIC | Age: 54
End: 2024-09-10
Payer: COMMERCIAL

## 2024-09-10 VITALS
DIASTOLIC BLOOD PRESSURE: 88 MMHG | WEIGHT: 210.38 LBS | OXYGEN SATURATION: 98 % | TEMPERATURE: 98 F | HEART RATE: 71 BPM | BODY MASS INDEX: 32.22 KG/M2 | RESPIRATION RATE: 16 BRPM | SYSTOLIC BLOOD PRESSURE: 128 MMHG

## 2024-09-10 DIAGNOSIS — F41.9 ANXIETY: ICD-10-CM

## 2024-09-10 DIAGNOSIS — F41.0 PANIC ATTACK: Primary | ICD-10-CM

## 2024-09-10 PROCEDURE — G8417 CALC BMI ABV UP PARAM F/U: HCPCS | Performed by: FAMILY MEDICINE

## 2024-09-10 PROCEDURE — 99214 OFFICE O/P EST MOD 30 MIN: CPT | Performed by: FAMILY MEDICINE

## 2024-09-10 PROCEDURE — 1036F TOBACCO NON-USER: CPT | Performed by: FAMILY MEDICINE

## 2024-09-10 PROCEDURE — 3017F COLORECTAL CA SCREEN DOC REV: CPT | Performed by: FAMILY MEDICINE

## 2024-09-10 PROCEDURE — G8427 DOCREV CUR MEDS BY ELIG CLIN: HCPCS | Performed by: FAMILY MEDICINE

## 2024-09-10 RX ORDER — ALPRAZOLAM 0.5 MG
0.5 TABLET ORAL 3 TIMES DAILY PRN
Qty: 90 TABLET | Refills: 0 | Status: SHIPPED | OUTPATIENT
Start: 2024-09-10 | End: 2024-10-10

## 2024-09-12 LAB
SHBG SERPL-SCNC: 48 NMOL/L (ref 19–76)
TESTOST FREE SERPL-MCNC: 73.8 PG/ML (ref 47–244)
TESTOST SERPL-MCNC: 455 NG/DL (ref 193–740)

## 2024-10-14 ENCOUNTER — OFFICE VISIT (OUTPATIENT)
Dept: FAMILY MEDICINE CLINIC | Age: 54
End: 2024-10-14
Payer: COMMERCIAL

## 2024-10-14 VITALS
OXYGEN SATURATION: 96 % | BODY MASS INDEX: 31.76 KG/M2 | WEIGHT: 207.38 LBS | DIASTOLIC BLOOD PRESSURE: 80 MMHG | SYSTOLIC BLOOD PRESSURE: 110 MMHG | TEMPERATURE: 97.4 F | RESPIRATION RATE: 12 BRPM | HEART RATE: 63 BPM

## 2024-10-14 DIAGNOSIS — F41.9 ANXIETY: Primary | ICD-10-CM

## 2024-10-14 DIAGNOSIS — N52.9 ERECTILE DYSFUNCTION, UNSPECIFIED ERECTILE DYSFUNCTION TYPE: ICD-10-CM

## 2024-10-14 DIAGNOSIS — L72.0 INCLUSION CYST: ICD-10-CM

## 2024-10-14 PROCEDURE — 3017F COLORECTAL CA SCREEN DOC REV: CPT | Performed by: FAMILY MEDICINE

## 2024-10-14 PROCEDURE — G8417 CALC BMI ABV UP PARAM F/U: HCPCS | Performed by: FAMILY MEDICINE

## 2024-10-14 PROCEDURE — G8427 DOCREV CUR MEDS BY ELIG CLIN: HCPCS | Performed by: FAMILY MEDICINE

## 2024-10-14 PROCEDURE — G8484 FLU IMMUNIZE NO ADMIN: HCPCS | Performed by: FAMILY MEDICINE

## 2024-10-14 PROCEDURE — 1036F TOBACCO NON-USER: CPT | Performed by: FAMILY MEDICINE

## 2024-10-14 PROCEDURE — 99214 OFFICE O/P EST MOD 30 MIN: CPT | Performed by: FAMILY MEDICINE

## 2024-10-14 RX ORDER — SILDENAFIL 50 MG/1
50 TABLET, FILM COATED ORAL PRN
Qty: 30 TABLET | Refills: 3 | Status: SHIPPED | OUTPATIENT
Start: 2024-10-14

## 2024-10-14 NOTE — PROGRESS NOTES
Subjective   Patient ID: Jeromy Infante is a 54 y.o. male.  CC: Patient presents for re-evaluation of chronic health problems including anxiety and skin concern.    HPI pt is here for a follow up and would like to discuss test results from 9/9/24. Pt also states he has a growth in the middle of his sternum about a pea size.  Patient has noted this for some time wanted to bring it to my attention.  He feels his anxiety and stress is significantly improved.  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  He is sleeping much better and appetite has returned.  He would like to maintain the Zoloft medication for the next couple months and then wean off again.  He is taking the Xanax medication only at bedtime and does not request refill.  He is also having some BPH symptoms and wants to discuss ED issues.  He has noted decreased stream and decreased ability have an erection.    Review of Systems     Patient Active Problem List   Diagnosis    Non morbid obesity, unspecified obesity type    Obstructive sleep apnea syndrome    Tinea unguium    Anxiety       Outpatient Medications Marked as Taking for the 10/14/24 encounter (Office Visit) with Brent Rawls MD   Medication Sig Dispense Refill    sertraline (ZOLOFT) 50 MG tablet Take 1 tablet by mouth daily 30 tablet 5       Allergies   Allergen Reactions    Erythromycin      Stomach cramps       Social History     Tobacco Use    Smoking status: Never    Smokeless tobacco: Never   Substance Use Topics    Alcohol use: No       /80 (Site: Right Upper Arm, Position: Sitting, Cuff Size: Large Adult)   Pulse 63   Temp 97.4 °F (36.3 °C) (Infrared)   Resp 12   Wt 94.1 kg (207 lb 6 oz)   SpO2 96%   BMI 31.76 kg/m²        Objective   Physical Exam  Vitals and nursing note reviewed.   Constitutional:       General: He is not in acute distress.     Appearance: Normal appearance. He is well-developed and

## 2024-11-05 ENCOUNTER — TELEMEDICINE (OUTPATIENT)
Dept: PULMONOLOGY | Age: 54
End: 2024-11-05
Payer: COMMERCIAL

## 2024-11-05 VITALS — BODY MASS INDEX: 30.66 KG/M2 | HEIGHT: 69 IN | WEIGHT: 207 LBS

## 2024-11-05 DIAGNOSIS — G47.33 OBSTRUCTIVE SLEEP APNEA SYNDROME: Primary | ICD-10-CM

## 2024-11-05 DIAGNOSIS — E66.9 NON MORBID OBESITY, UNSPECIFIED OBESITY TYPE: Chronic | ICD-10-CM

## 2024-11-05 PROCEDURE — G2211 COMPLEX E/M VISIT ADD ON: HCPCS | Performed by: NURSE PRACTITIONER

## 2024-11-05 PROCEDURE — G8427 DOCREV CUR MEDS BY ELIG CLIN: HCPCS | Performed by: NURSE PRACTITIONER

## 2024-11-05 PROCEDURE — 99214 OFFICE O/P EST MOD 30 MIN: CPT | Performed by: NURSE PRACTITIONER

## 2024-11-05 PROCEDURE — 3017F COLORECTAL CA SCREEN DOC REV: CPT | Performed by: NURSE PRACTITIONER

## 2024-11-05 ASSESSMENT — SLEEP AND FATIGUE QUESTIONNAIRES
HOW LIKELY ARE YOU TO NOD OFF OR FALL ASLEEP WHILE SITTING QUIETLY AFTER LUNCH WITHOUT ALCOHOL: SLIGHT CHANCE OF DOZING
HOW LIKELY ARE YOU TO NOD OFF OR FALL ASLEEP IN A CAR, WHILE STOPPED FOR A FEW MINUTES IN TRAFFIC: WOULD NEVER DOZE
HOW LIKELY ARE YOU TO NOD OFF OR FALL ASLEEP WHILE WATCHING TV: SLIGHT CHANCE OF DOZING
HOW LIKELY ARE YOU TO NOD OFF OR FALL ASLEEP WHILE WATCHING TV: SLIGHT CHANCE OF DOZING
HOW LIKELY ARE YOU TO NOD OFF OR FALL ASLEEP WHILE LYING DOWN TO REST IN THE AFTERNOON WHEN CIRCUMSTANCES PERMIT: MODERATE CHANCE OF DOZING
HOW LIKELY ARE YOU TO NOD OFF OR FALL ASLEEP WHILE SITTING AND TALKING TO SOMEONE: WOULD NEVER DOZE
HOW LIKELY ARE YOU TO NOD OFF OR FALL ASLEEP WHILE SITTING INACTIVE IN A PUBLIC PLACE: SLIGHT CHANCE OF DOZING
ESS TOTAL SCORE: 9
HOW LIKELY ARE YOU TO NOD OFF OR FALL ASLEEP WHILE SITTING QUIETLY AFTER LUNCH WITHOUT ALCOHOL: SLIGHT CHANCE OF DOZING
HOW LIKELY ARE YOU TO NOD OFF OR FALL ASLEEP WHILE SITTING AND READING: MODERATE CHANCE OF DOZING
HOW LIKELY ARE YOU TO NOD OFF OR FALL ASLEEP WHILE SITTING INACTIVE IN A PUBLIC PLACE: SLIGHT CHANCE OF DOZING
HOW LIKELY ARE YOU TO NOD OFF OR FALL ASLEEP IN A CAR, WHILE STOPPED FOR A FEW MINUTES IN TRAFFIC: WOULD NEVER DOZE
HOW LIKELY ARE YOU TO NOD OFF OR FALL ASLEEP WHILE LYING DOWN TO REST IN THE AFTERNOON WHEN CIRCUMSTANCES PERMIT: MODERATE CHANCE OF DOZING
HOW LIKELY ARE YOU TO NOD OFF OR FALL ASLEEP WHILE SITTING AND TALKING TO SOMEONE: WOULD NEVER DOZE
HOW LIKELY ARE YOU TO NOD OFF OR FALL ASLEEP WHILE SITTING AND READING: MODERATE CHANCE OF DOZING
HOW LIKELY ARE YOU TO NOD OFF OR FALL ASLEEP WHEN YOU ARE A PASSENGER IN A CAR FOR AN HOUR WITHOUT A BREAK: MODERATE CHANCE OF DOZING
HOW LIKELY ARE YOU TO NOD OFF OR FALL ASLEEP WHEN YOU ARE A PASSENGER IN A CAR FOR AN HOUR WITHOUT A BREAK: MODERATE CHANCE OF DOZING

## 2024-11-05 NOTE — PROGRESS NOTES
Darvin Alexandre [facility]  [address]  [address]  [place] [address]  P- [phone]  F- [phone]   Video Visit- Follow up      Assessment/Plan:      1. Obstructive sleep apnea syndrome  Assessment & Plan:  Chronic-Stable: Reviewed and analyzed results of physiologic download from patient's machine and reviewed with patient.  Supplies and parts as needed for his machine.  These are medically necessary.  Limit caffeine use after 3pm. Based on the analyzed data will continue with current settings. Stable on his machine at current settings, getting benefit from the use, and having minimal side effects. Discussed could consider trying a mask liner to help with mask leak. Provided resources for him to view mask liners. Encouraged him to contact his DME to schedule a mask fitting or try a different style of mask. Also discussed using the humidifier on his machine to help with dryness. Will see him back in 1 year. Encouraged him to contact the office with any questions or concerns.      2. Non morbid obesity, unspecified obesity type  Assessment & Plan:  Chronic-not stable:  Discussed importance of treating obstructive sleep apnea and getting sufficient sleep to assist with weight control.  Discussed weight gain and/or weight loss may require adjustments to machine settings. Encouraged him to work on weight loss through diet and exercise.         Reviewed, analyzed, and documented physiologic data from patient's PAP machine.    This information was analyzed to assess complexity and medical decision making in regards to further testing and management.    The primary encounter diagnosis was Obstructive sleep apnea syndrome. A diagnosis of Non morbid obesity, unspecified obesity type was also pertinent to this visit. The chronic medical conditions listed are directly related to the primary diagnosis listed above.  The management of the primary diagnosis affects the secondary

## 2024-11-05 NOTE — ASSESSMENT & PLAN NOTE
Chronic-Stable: Reviewed and analyzed results of physiologic download from patient's machine and reviewed with patient.  Supplies and parts as needed for his machine.  These are medically necessary.  Limit caffeine use after 3pm. Based on the analyzed data will continue with current settings. Stable on his machine at current settings, getting benefit from the use, and having minimal side effects. Discussed could consider trying a mask liner to help with mask leak. Provided resources for him to view mask liners. Encouraged him to contact his DME to schedule a mask fitting or try a different style of mask. Also discussed using the humidifier on his machine to help with dryness. Will see him back in 1 year. Encouraged him to contact the office with any questions or concerns.

## 2024-11-05 NOTE — PROGRESS NOTES
Diagnosis: [x] SAL (G47.33) [] CSA (G47.31) [] Apnea (G47.30)   Length of Need: [x] 15 Months [] 99 Months [] Other:   Machine (JAILENE!): [] Respironics Dream Station      Auto [] ResMed AirSense     Auto [] Other:     []  CPAP () [] Bilevel ()   Mode: [] Auto [] Spontaneous    Mode: [] Auto [] Spontaneous             Comfort Settings:      Humidifier: [] Heated ()        [x] Water chamber replacement ()/ 1 per 6 months        Mask:   [] Nasal () /1 per 3 months [x] Full Face () /1 per 3 months   [] Patient choice -Size and fit mask [x] Patient Choice - Size and fit mask   [] Dispense: [] Dispense:   [] Headgear () / 1 per 3 months [x] Headgear () / 1 per 3 months   [] Replacement Nasal Cushion ()/2 per month [x] Interface Replacement ()/1 per month   [] Replacement Nasal Pillows ()/2 per month         Tubing: [x] Heated ()/1 per 3 months    [] Standard ()/1 per 3 months [] Other:           Filters: [x] Non-disposable ()/1 per 6 months     [x] Ultra-Fine, Disposable ()/2 per month        Miscellaneous: [] Chin Strap ()/ 1 per 6 months [] O2 bleed-in:        LPM   [] Oxymetry on CPAP/Bilevel []  Other:         Start Order Date: 11/05/24    MEDICAL JUSTIFICATION:  I, the undersigned, certify that the above prescribed supplies are medically necessary for this patient’s wellbeing.  In my opinion, the supplies are both reasonable and necessary in reference to accepted standards of medicalpractice in treatment of this patient’s condition.    JONATHAN SMALL NP    NPI: [id number]       Order Signed Date: 11/05/24  [facility]  Pulmonary, Sleep, and Critical Care    Pulmonary, Sleep, and Critical Care  [facility] [address]                          [address] [place] [address] [address]  [place] [address]                                    [place] [address]  Phone: [phone]    Fax:

## 2025-03-31 ENCOUNTER — RESULTS FOLLOW-UP (OUTPATIENT)
Age: 55
End: 2025-03-31

## 2025-03-31 ENCOUNTER — OFFICE VISIT (OUTPATIENT)
Age: 55
End: 2025-03-31

## 2025-03-31 VITALS
HEART RATE: 82 BPM | BODY MASS INDEX: 33.34 KG/M2 | HEIGHT: 68 IN | DIASTOLIC BLOOD PRESSURE: 80 MMHG | OXYGEN SATURATION: 95 % | TEMPERATURE: 97.9 F | SYSTOLIC BLOOD PRESSURE: 126 MMHG | WEIGHT: 220 LBS

## 2025-03-31 DIAGNOSIS — M79.671 RIGHT FOOT PAIN: ICD-10-CM

## 2025-03-31 DIAGNOSIS — M77.8 EXTENSOR TENDONITIS OF FOOT: Primary | ICD-10-CM

## 2025-03-31 RX ORDER — METHYLPREDNISOLONE 4 MG/1
TABLET ORAL
Qty: 1 KIT | Refills: 0 | Status: SHIPPED | OUTPATIENT
Start: 2025-03-31 | End: 2025-04-06

## 2025-03-31 RX ORDER — IBUPROFEN 600 MG/1
600 TABLET, FILM COATED ORAL EVERY 6 HOURS
Qty: 40 TABLET | Refills: 0 | Status: SHIPPED | OUTPATIENT
Start: 2025-03-31

## 2025-03-31 NOTE — PATIENT INSTRUCTIONS
Imaging does not reveal any significant breaks. A radiologist will view image and we will call you if anything is noted to be abnormal.     Symptoms likely due to extensor tendonitis. The pain is caused by inflammation.     Prescribed medrol dose gaudencio and ibuprofen to reduce pain and inflammation.     REST. ICE. COMPRESS. And ELEVATE.     Follow up with PCP in 5-7 day if symptoms worsen or fail to improve.

## 2025-03-31 NOTE — PROGRESS NOTES
Jeromy Infante (:  1970) is a 54 y.o. male,New patient, here for evaluation of the following chief complaint(s):  Foot Pain (Right dorsal foot pain with NKI x1 week)      Assessment & Plan :  Visit Diagnoses and Associated Orders         Extensor tendonitis of foot    -  Primary    methylPREDNISolone (MEDROL DOSEPACK) 4 MG tablet [4991]      ibuprofen (ADVIL;MOTRIN) 600 MG tablet [3844]             Right foot pain        XR FOOT RIGHT (MIN 3 VIEWS) [26028 CPT(R)]   - Future Order    ibuprofen (ADVIL;MOTRIN) 600 MG tablet [3844]                   Patient was seen and evaluated for above symptoms.  Imaging of right foot obtained to rule out possible fracture.  Imaging does not reveal any gross abnormalities.  Discussed imaging with patient.  Assessment likely due to extensor tendinitis. Imaging does not reveal any significant breaks. Discussed with patient the radiologist will view image and we will call you if anything is noted to be abnormal. Prescribed medrol dose gaudencio and ibuprofen to reduce pain and inflammation.  Educated on rice therapy. REST. ICE. COMPRESS. And ELEVATE. Instructed to Follow up with PCP in 5-7 day if symptoms worsen or fail to improve. Patient verbalized and agreed to plan of care.        Subjective :    Foot Pain          54 y.o. male presents with symptoms of right foot pain.  Reports onset of symptoms approximately 2 weeks ago.  Reports he did not associate it with injuring his foot.  Reports now that he thinks back he did kick the tired to get it off of his vehicle while he was working on it but did not have pain at the time.  Reports the pain started approximately 2 days later.  Reports the pain is constant and at times it seems to be aggravated even by a simple twist or touch.  Rating his pain right now 3 out of 4. Reports, he it appears to be swollen. Report it feels better when he has \"thicker\" socks on and tight shoes.          Vitals:    25 1529 25 1609   BP: (!)

## 2025-05-05 ENCOUNTER — OFFICE VISIT (OUTPATIENT)
Dept: FAMILY MEDICINE CLINIC | Age: 55
End: 2025-05-05
Payer: COMMERCIAL

## 2025-05-05 VITALS
WEIGHT: 233.25 LBS | RESPIRATION RATE: 16 BRPM | BODY MASS INDEX: 35.47 KG/M2 | TEMPERATURE: 97.7 F | HEART RATE: 87 BPM | DIASTOLIC BLOOD PRESSURE: 82 MMHG | SYSTOLIC BLOOD PRESSURE: 112 MMHG | OXYGEN SATURATION: 96 %

## 2025-05-05 DIAGNOSIS — Z00.00 ANNUAL PHYSICAL EXAM: Primary | ICD-10-CM

## 2025-05-05 DIAGNOSIS — E78.6 LOW HDL (UNDER 40): ICD-10-CM

## 2025-05-05 DIAGNOSIS — M79.671 RIGHT FOOT PAIN: ICD-10-CM

## 2025-05-05 DIAGNOSIS — Z13.220 SCREENING FOR HYPERLIPIDEMIA: ICD-10-CM

## 2025-05-05 DIAGNOSIS — M25.572 CHRONIC PAIN OF LEFT ANKLE: ICD-10-CM

## 2025-05-05 DIAGNOSIS — F41.9 ANXIETY: ICD-10-CM

## 2025-05-05 DIAGNOSIS — N52.9 ERECTILE DYSFUNCTION, UNSPECIFIED ERECTILE DYSFUNCTION TYPE: ICD-10-CM

## 2025-05-05 DIAGNOSIS — G47.33 OBSTRUCTIVE SLEEP APNEA SYNDROME: ICD-10-CM

## 2025-05-05 DIAGNOSIS — G89.29 CHRONIC PAIN OF LEFT ANKLE: ICD-10-CM

## 2025-05-05 PROCEDURE — 99213 OFFICE O/P EST LOW 20 MIN: CPT | Performed by: NURSE PRACTITIONER

## 2025-05-05 PROCEDURE — G8427 DOCREV CUR MEDS BY ELIG CLIN: HCPCS | Performed by: NURSE PRACTITIONER

## 2025-05-05 PROCEDURE — 1036F TOBACCO NON-USER: CPT | Performed by: NURSE PRACTITIONER

## 2025-05-05 PROCEDURE — G8417 CALC BMI ABV UP PARAM F/U: HCPCS | Performed by: NURSE PRACTITIONER

## 2025-05-05 PROCEDURE — 99396 PREV VISIT EST AGE 40-64: CPT | Performed by: NURSE PRACTITIONER

## 2025-05-05 PROCEDURE — 3017F COLORECTAL CA SCREEN DOC REV: CPT | Performed by: NURSE PRACTITIONER

## 2025-05-05 SDOH — ECONOMIC STABILITY: FOOD INSECURITY: WITHIN THE PAST 12 MONTHS, YOU WORRIED THAT YOUR FOOD WOULD RUN OUT BEFORE YOU GOT MONEY TO BUY MORE.: NEVER TRUE

## 2025-05-05 SDOH — ECONOMIC STABILITY: FOOD INSECURITY: WITHIN THE PAST 12 MONTHS, THE FOOD YOU BOUGHT JUST DIDN'T LAST AND YOU DIDN'T HAVE MONEY TO GET MORE.: NEVER TRUE

## 2025-05-05 ASSESSMENT — PATIENT HEALTH QUESTIONNAIRE - PHQ9
2. FEELING DOWN, DEPRESSED OR HOPELESS: NOT AT ALL
1. LITTLE INTEREST OR PLEASURE IN DOING THINGS: NOT AT ALL
SUM OF ALL RESPONSES TO PHQ QUESTIONS 1-9: 0
2. FEELING DOWN, DEPRESSED OR HOPELESS: NOT AT ALL
SUM OF ALL RESPONSES TO PHQ QUESTIONS 1-9: 0
SUM OF ALL RESPONSES TO PHQ9 QUESTIONS 1 & 2: 0
SUM OF ALL RESPONSES TO PHQ QUESTIONS 1-9: 0
1. LITTLE INTEREST OR PLEASURE IN DOING THINGS: NOT AT ALL
SUM OF ALL RESPONSES TO PHQ QUESTIONS 1-9: 0

## 2025-05-05 NOTE — PROGRESS NOTES
10y-64y), BOOSTRIX (age 10y+), IM, 0.5mL 11/22/2013, 10/29/2022    Influenza vaccine:  recommended every fall              Wt Readings from Last 3 Encounters:   05/05/25 105.8 kg (233 lb 4 oz)   03/31/25 99.8 kg (220 lb)   11/05/24 93.9 kg (207 lb)     BP Readings from Last 3 Encounters:   05/05/25 112/82   03/31/25 126/80   10/14/24 110/80       Patient Active Problem List   Diagnosis    Non morbid obesity, unspecified obesity type    Obstructive sleep apnea syndrome    Tinea unguium    Anxiety    Erectile dysfunction       Allergies   Allergen Reactions    Erythromycin      Stomach cramps     No outpatient medications have been marked as taking for the 5/5/25 encounter (Office Visit) with Ava Erwin APRN - CNP.       Past Medical History:   Diagnosis Date    Sleep apnea     Tendonitis 2025     Past Surgical History:   Procedure Laterality Date    DENTAL SURGERY      FOOT SURGERY      pencil lead removal    SHOULDER ARTHROSCOPY Left 01/29/2016    Subacromial Decompression Labral Debridement Distal Clavicle Excision    SHOULDER ARTHROSCOPY Right 06/24/2016     RIGHT SHOULDER ARTHROSCOPE, SUBACROMIAL DECOMPRESSION, DISTAL     Family History   Problem Relation Age of Onset    Hypertension Mother     Diabetes Mother     Alcohol Abuse Father     Diabetes Maternal Aunt     Hypertension Maternal Grandmother     Diabetes Maternal Grandmother      Social History     Socioeconomic History    Marital status:      Spouse name: Not on file    Number of children: Not on file    Years of education: Not on file    Highest education level: Not on file   Occupational History    Not on file   Tobacco Use    Smoking status: Never    Smokeless tobacco: Never   Vaping Use    Vaping status: Never Used   Substance and Sexual Activity    Alcohol use: No    Drug use: No    Sexual activity: Not on file   Other Topics Concern    Not on file   Social History Narrative    Not on file     Social Drivers of Health     Financial

## 2025-05-06 ENCOUNTER — OFFICE VISIT (OUTPATIENT)
Dept: ORTHOPEDIC SURGERY | Age: 55
End: 2025-05-06

## 2025-05-06 VITALS — HEIGHT: 68 IN | BODY MASS INDEX: 35.31 KG/M2 | WEIGHT: 233 LBS

## 2025-05-06 DIAGNOSIS — M76.71 PERONEAL TENDINITIS OF RIGHT LOWER EXTREMITY: Primary | ICD-10-CM

## 2025-05-06 DIAGNOSIS — G89.29 CHRONIC PAIN OF LEFT ANKLE: ICD-10-CM

## 2025-05-06 DIAGNOSIS — M25.572 CHRONIC PAIN OF LEFT ANKLE: ICD-10-CM

## 2025-05-07 PROBLEM — M76.71 PERONEAL TENDINITIS OF RIGHT LOWER EXTREMITY: Status: ACTIVE | Noted: 2025-05-07

## 2025-05-07 RX ORDER — NAPROXEN 500 MG/1
500 TABLET ORAL 2 TIMES DAILY WITH MEALS
Qty: 60 TABLET | Refills: 0 | Status: SHIPPED | OUTPATIENT
Start: 2025-05-07 | End: 2025-06-06

## 2025-05-07 NOTE — PROGRESS NOTES
CHIEF COMPLAINT: Left posterior-lateral ankle pain/peroneal tendenosis.      HISTORY:  Mr. Infante 54 y.o.  male presents today for consultation request from Ava Erwin APRN - CNP for evaluation of left posterior-lateral ankle pain which started summer 2024 after he rolled his left ankle.  He is complaining of sharp pain, 4/10.  Pain is increase with standing and walking, and decrease with rest. Pain is sharp with first few steps, dull achy pain by the end of the day. The pain radiates to lateral leg, and no numbness and tingling sensation. No other complaint.      Past Medical History:   Diagnosis Date    Sleep apnea     Tendonitis 2025       Past Surgical History:   Procedure Laterality Date    DENTAL SURGERY      FOOT SURGERY      pencil lead removal    SHOULDER ARTHROSCOPY Left 01/29/2016    Subacromial Decompression Labral Debridement Distal Clavicle Excision    SHOULDER ARTHROSCOPY Right 06/24/2016     RIGHT SHOULDER ARTHROSCOPE, SUBACROMIAL DECOMPRESSION, DISTAL       Social History     Socioeconomic History    Marital status:      Spouse name: Not on file    Number of children: Not on file    Years of education: Not on file    Highest education level: Not on file   Occupational History    Not on file   Tobacco Use    Smoking status: Never    Smokeless tobacco: Never   Vaping Use    Vaping status: Never Used   Substance and Sexual Activity    Alcohol use: No    Drug use: No    Sexual activity: Not on file   Other Topics Concern    Not on file   Social History Narrative    Not on file     Social Drivers of Health     Financial Resource Strain: Low Risk  (4/30/2024)    Overall Financial Resource Strain (CARDIA)     Difficulty of Paying Living Expenses: Not hard at all   Food Insecurity: No Food Insecurity (5/5/2025)    Hunger Vital Sign     Worried About Running Out of Food in the Last Year: Never true     Ran Out of Food in the Last Year: Never true   Transportation Needs: No

## 2025-06-17 ENCOUNTER — OFFICE VISIT (OUTPATIENT)
Dept: ORTHOPEDIC SURGERY | Age: 55
End: 2025-06-17
Payer: COMMERCIAL

## 2025-06-17 VITALS — HEIGHT: 68 IN | WEIGHT: 233.03 LBS | BODY MASS INDEX: 35.32 KG/M2

## 2025-06-17 DIAGNOSIS — M76.71 PERONEAL TENDINITIS OF RIGHT LOWER EXTREMITY: Primary | ICD-10-CM

## 2025-06-17 PROCEDURE — G8417 CALC BMI ABV UP PARAM F/U: HCPCS | Performed by: ORTHOPAEDIC SURGERY

## 2025-06-17 PROCEDURE — G8427 DOCREV CUR MEDS BY ELIG CLIN: HCPCS | Performed by: ORTHOPAEDIC SURGERY

## 2025-06-17 PROCEDURE — 3017F COLORECTAL CA SCREEN DOC REV: CPT | Performed by: ORTHOPAEDIC SURGERY

## 2025-06-17 PROCEDURE — 1036F TOBACCO NON-USER: CPT | Performed by: ORTHOPAEDIC SURGERY

## 2025-06-17 PROCEDURE — 99213 OFFICE O/P EST LOW 20 MIN: CPT | Performed by: ORTHOPAEDIC SURGERY

## 2025-06-17 NOTE — PROGRESS NOTES
CHIEF COMPLAINT: Left posterior-lateral ankle pain/peroneal tendenosis.      HISTORY:  Mr. Infante 54 y.o.  male presents today for f/u. He was seen on 5/6/2025 as a consultation request from Ava Erwin APRN - CNP for evaluation of left posterior-lateral ankle pain which started summer 2024 after he rolled his left ankle.  He is complaining of achy pain, 0/10.  Pain is increase with standing and walking, and decrease with rest. Pain is achy with first few steps, dull achy pain by the end of the day. The pain radiates to lateral leg, and no numbness and tingling sensation. No other complaint.      Past Medical History:   Diagnosis Date    Sleep apnea     Tendonitis 2025       Past Surgical History:   Procedure Laterality Date    DENTAL SURGERY      FOOT SURGERY      pencil lead removal    SHOULDER ARTHROSCOPY Left 01/29/2016    Subacromial Decompression Labral Debridement Distal Clavicle Excision    SHOULDER ARTHROSCOPY Right 06/24/2016     RIGHT SHOULDER ARTHROSCOPE, SUBACROMIAL DECOMPRESSION, DISTAL       Social History     Socioeconomic History    Marital status:      Spouse name: Not on file    Number of children: Not on file    Years of education: Not on file    Highest education level: Not on file   Occupational History    Not on file   Tobacco Use    Smoking status: Never    Smokeless tobacco: Never   Vaping Use    Vaping status: Never Used   Substance and Sexual Activity    Alcohol use: No    Drug use: No    Sexual activity: Not on file   Other Topics Concern    Not on file   Social History Narrative    Not on file     Social Drivers of Health     Financial Resource Strain: Low Risk  (4/30/2024)    Overall Financial Resource Strain (CARDIA)     Difficulty of Paying Living Expenses: Not hard at all   Food Insecurity: No Food Insecurity (5/5/2025)    Hunger Vital Sign     Worried About Running Out of Food in the Last Year: Never true     Ran Out of Food in the Last Year: Never true

## 2025-06-24 ENCOUNTER — OFFICE VISIT (OUTPATIENT)
Age: 55
End: 2025-06-24

## 2025-06-24 VITALS
WEIGHT: 220 LBS | TEMPERATURE: 97.8 F | HEIGHT: 69 IN | HEART RATE: 78 BPM | OXYGEN SATURATION: 95 % | DIASTOLIC BLOOD PRESSURE: 76 MMHG | BODY MASS INDEX: 32.58 KG/M2 | SYSTOLIC BLOOD PRESSURE: 124 MMHG

## 2025-06-24 DIAGNOSIS — J01.00 ACUTE NON-RECURRENT MAXILLARY SINUSITIS: Primary | ICD-10-CM

## 2025-06-24 RX ORDER — PREDNISONE 20 MG/1
20 TABLET ORAL 2 TIMES DAILY
Qty: 6 TABLET | Refills: 0 | Status: SHIPPED | OUTPATIENT
Start: 2025-06-24 | End: 2025-06-27

## 2025-06-24 RX ORDER — DEXTROMETHORPHAN HYDROBROMIDE, GUAIFENESIN AND PSEUDOEPHEDRINE HYDROCHLORIDE 15; 400; 60 MG/1; MG/1; MG/1
1 TABLET ORAL EVERY 6 HOURS
Qty: 20 TABLET | Refills: 0 | Status: SHIPPED | OUTPATIENT
Start: 2025-06-24 | End: 2025-06-29

## 2025-06-24 ASSESSMENT — ENCOUNTER SYMPTOMS
SINUS PRESSURE: 1
EYES NEGATIVE: 1
TROUBLE SWALLOWING: 0
EYE PAIN: 0
WHEEZING: 0
RESPIRATORY NEGATIVE: 1
SORE THROAT: 1
GASTROINTESTINAL NEGATIVE: 1
ABDOMINAL PAIN: 0
VOMITING: 0
FACIAL SWELLING: 0
RHINORRHEA: 0
DIARRHEA: 0
COUGH: 0
VOICE CHANGE: 0
NAUSEA: 0
SINUS PAIN: 1
SHORTNESS OF BREATH: 0

## 2025-06-24 NOTE — PROGRESS NOTES
Jeromy Infante (:  1970) is a 54 y.o. male,Established patient, here for evaluation of the following chief complaint(s):  Sinusitis (Pt states congestion, stuffy nasal, sore throat, both ears pain, and cough. Going on for 3-4weeks. )    Pt here for evaluation of sinus symptoms. Evaluation consistent with sinusitis. Augmentin prescribed for empiric coverage. Prednisone prescribed for inflammation of sinus.     ASSESSMENT/PLAN:  1. Acute non-recurrent maxillary sinusitis    - amoxicillin-clavulanate (AUGMENTIN) 875-125 MG per tablet; Take 1 tablet by mouth 2 times daily for 7 days  Dispense: 14 tablet; Refill: 0  - predniSONE (DELTASONE) 20 MG tablet; Take 1 tablet by mouth 2 times daily for 3 days  Dispense: 6 tablet; Refill: 0  - Pseudoephedrine-DM-GG (CAPMIST DM) 60- MG TABS; Take 1 tablet by mouth every 6 hours for 5 days  Dispense: 20 tablet; Refill: 0   -You may take Acetaminophen or Ibuprofen as directed on packaging for fever or discomfort.       Return if symptoms worsen or fail to improve.     AVS reviewed. All questions answered.  Instructions were acknowledged and verbalized by the patient.     SUBJECTIVE/OBJECTIVE:  HPI:   54 y.o. male presents alone for complaint of sinus symptoms x 3-4 weeks    Admits nasal congestion, rhinorrhea, post nasal drip, bilateral ear pain, headaches, and cough.    Denies fever and chills.     Has attempted OTC medication for sinus symptoms.     Nothing makes symptoms better or worse.    Patient provided the HPI by themself - the patient is considered to be a reliable historian.      History provided by:  Patient  Sinusitis  Associated symptoms include congestion, ear pain, sinus pressure and a sore throat. Pertinent negatives include no chills, coughing, headaches or shortness of breath.       Vitals:    25 1537 25 1555   BP: 128/89 124/76   BP Site: Left Upper Arm Right Upper Arm   Patient Position: Sitting    BP Cuff Size: Medium Adult    Pulse:

## 2025-07-29 ENCOUNTER — OFFICE VISIT (OUTPATIENT)
Dept: ORTHOPEDIC SURGERY | Age: 55
End: 2025-07-29
Payer: COMMERCIAL

## 2025-07-29 VITALS — HEIGHT: 69 IN | BODY MASS INDEX: 32.59 KG/M2 | WEIGHT: 220.02 LBS

## 2025-07-29 DIAGNOSIS — M76.71 PERONEAL TENDINITIS OF RIGHT LOWER EXTREMITY: Primary | ICD-10-CM

## 2025-07-29 PROCEDURE — G8427 DOCREV CUR MEDS BY ELIG CLIN: HCPCS | Performed by: NURSE PRACTITIONER

## 2025-07-29 PROCEDURE — 3017F COLORECTAL CA SCREEN DOC REV: CPT | Performed by: NURSE PRACTITIONER

## 2025-07-29 PROCEDURE — 99213 OFFICE O/P EST LOW 20 MIN: CPT | Performed by: NURSE PRACTITIONER

## 2025-07-29 PROCEDURE — G8417 CALC BMI ABV UP PARAM F/U: HCPCS | Performed by: NURSE PRACTITIONER

## 2025-07-29 PROCEDURE — 1036F TOBACCO NON-USER: CPT | Performed by: NURSE PRACTITIONER

## 2025-07-29 NOTE — PROGRESS NOTES
CHIEF COMPLAINT: Left posterior-lateral ankle pain/peroneal tendenosis.      HISTORY:  Jeromy Infante 55 y.o.   male presents today for f/u. He was seen on 5/6/2025 as a consultation request from Ava Erwin APRN - CNP for evaluation of left posterior-lateral ankle pain which started summer 2024 after he rolled his left ankle.  He denies pain, 0/10.  He was able to go to [employer] and hike 7 miles and had pain the rest of that day and then next day was much improved.No numbness and tingling sensation. No other complaint.      Past Medical History:   Diagnosis Date    Sleep apnea     Tendonitis 2025     Past Surgical History:   Procedure Laterality Date    DENTAL SURGERY      FOOT SURGERY      pencil lead removal    SHOULDER ARTHROSCOPY Left 01/29/2016    Subacromial Decompression Labral Debridement Distal Clavicle Excision    SHOULDER ARTHROSCOPY Right 06/24/2016     RIGHT SHOULDER ARTHROSCOPE, SUBACROMIAL DECOMPRESSION, DISTAL     Family History   Problem Relation Age of Onset    Hypertension Mother     Diabetes Mother     Alcohol Abuse Father     Diabetes Maternal Aunt     Hypertension Maternal Grandmother     Diabetes Maternal Grandmother      Social History     Socioeconomic History    Marital status:      Spouse name: Not on file    Number of children: Not on file    Years of education: Not on file    Highest education level: Not on file   Occupational History    Not on file   Tobacco Use    Smoking status: Never    Smokeless tobacco: Never   Vaping Use    Vaping status: Never Used   Substance and Sexual Activity    Alcohol use: No    Drug use: No    Sexual activity: Not on file   Other Topics Concern    Not on file   Social History Narrative    Not on file     Social Drivers of Health     Financial Resource Strain: Low Risk  (4/30/2024)    Overall Financial Resource Strain (CARDIA)     Difficulty of Paying Living Expenses: Not hard at all   Food Insecurity: No Food Insecurity (5/5/2025)

## 2025-08-28 ENCOUNTER — OFFICE VISIT (OUTPATIENT)
Age: 55
End: 2025-08-28

## 2025-08-28 VITALS
OXYGEN SATURATION: 94 % | TEMPERATURE: 98.4 F | DIASTOLIC BLOOD PRESSURE: 78 MMHG | WEIGHT: 235 LBS | HEART RATE: 86 BPM | BODY MASS INDEX: 33.64 KG/M2 | HEIGHT: 70 IN | SYSTOLIC BLOOD PRESSURE: 118 MMHG

## 2025-08-28 DIAGNOSIS — J06.9 VIRAL URI WITH COUGH: Primary | ICD-10-CM

## 2025-08-28 DIAGNOSIS — R51.9 ACUTE NONINTRACTABLE HEADACHE, UNSPECIFIED HEADACHE TYPE: ICD-10-CM

## 2025-08-28 LAB
INFLUENZA A ANTIBODY: NORMAL
INFLUENZA B ANTIBODY: NORMAL
Lab: NORMAL
PERFORMING INSTRUMENT: NORMAL
QC PASS/FAIL: NORMAL
SARS-COV-2, POC: NORMAL

## 2025-08-28 RX ORDER — CETIRIZINE HYDROCHLORIDE, PSEUDOEPHEDRINE HYDROCHLORIDE 5; 120 MG/1; MG/1
1 TABLET, FILM COATED, EXTENDED RELEASE ORAL 2 TIMES DAILY
Qty: 60 TABLET | Refills: 0 | Status: SHIPPED | OUTPATIENT
Start: 2025-08-28 | End: 2025-09-27

## 2025-08-28 RX ORDER — FLUTICASONE PROPIONATE 50 MCG
2 SPRAY, SUSPENSION (ML) NASAL DAILY
Qty: 1 EACH | Refills: 1 | Status: SHIPPED | OUTPATIENT
Start: 2025-08-28

## 2025-08-28 RX ORDER — BROMPHENIRAMINE MALEATE, PSEUDOEPHEDRINE HYDROCHLORIDE, AND DEXTROMETHORPHAN HYDROBROMIDE 2; 30; 10 MG/5ML; MG/5ML; MG/5ML
5 SYRUP ORAL 4 TIMES DAILY PRN
Qty: 118 ML | Refills: 0 | Status: SHIPPED | OUTPATIENT
Start: 2025-08-28